# Patient Record
Sex: FEMALE | Race: WHITE | NOT HISPANIC OR LATINO | Employment: OTHER | URBAN - METROPOLITAN AREA
[De-identification: names, ages, dates, MRNs, and addresses within clinical notes are randomized per-mention and may not be internally consistent; named-entity substitution may affect disease eponyms.]

---

## 2017-05-24 ENCOUNTER — CONVERSION ENCOUNTER (OUTPATIENT)
Dept: OTHER | Facility: HOSPITAL | Age: 72
End: 2017-05-24

## 2017-11-01 ENCOUNTER — CONVERSION ENCOUNTER (OUTPATIENT)
Dept: OTHER | Facility: HOSPITAL | Age: 72
End: 2017-11-01

## 2017-11-08 ENCOUNTER — CONVERSION ENCOUNTER (OUTPATIENT)
Dept: OTHER | Facility: HOSPITAL | Age: 72
End: 2017-11-08

## 2017-12-13 ENCOUNTER — CONVERSION ENCOUNTER (OUTPATIENT)
Dept: OTHER | Facility: HOSPITAL | Age: 72
End: 2017-12-13

## 2017-12-28 ENCOUNTER — CONVERSION ENCOUNTER (OUTPATIENT)
Dept: OTHER | Facility: HOSPITAL | Age: 72
End: 2017-12-28

## 2018-05-09 ENCOUNTER — CONVERSION ENCOUNTER (OUTPATIENT)
Dept: OTHER | Facility: HOSPITAL | Age: 73
End: 2018-05-09

## 2018-07-11 ENCOUNTER — CONVERSION ENCOUNTER (OUTPATIENT)
Dept: OTHER | Facility: HOSPITAL | Age: 73
End: 2018-07-11

## 2018-10-24 ENCOUNTER — CONVERSION ENCOUNTER (OUTPATIENT)
Dept: OTHER | Facility: HOSPITAL | Age: 73
End: 2018-10-24

## 2019-01-09 ENCOUNTER — CONVERSION ENCOUNTER (OUTPATIENT)
Dept: OTHER | Facility: HOSPITAL | Age: 74
End: 2019-01-09

## 2019-06-04 VITALS
OXYGEN SATURATION: 94 % | WEIGHT: 186.6 LBS | HEART RATE: 60 BPM | HEIGHT: 65 IN | DIASTOLIC BLOOD PRESSURE: 93 MMHG | HEIGHT: 65 IN | HEART RATE: 61 BPM | DIASTOLIC BLOOD PRESSURE: 92 MMHG | WEIGHT: 187.6 LBS | DIASTOLIC BLOOD PRESSURE: 83 MMHG | SYSTOLIC BLOOD PRESSURE: 145 MMHG | OXYGEN SATURATION: 97 % | OXYGEN SATURATION: 96 % | HEIGHT: 65 IN | DIASTOLIC BLOOD PRESSURE: 84 MMHG | SYSTOLIC BLOOD PRESSURE: 162 MMHG | HEIGHT: 65 IN | DIASTOLIC BLOOD PRESSURE: 82 MMHG | WEIGHT: 190.2 LBS | HEART RATE: 61 BPM | OXYGEN SATURATION: 97 % | HEART RATE: 58 BPM | HEART RATE: 59 BPM | WEIGHT: 189.8 LBS | BODY MASS INDEX: 31.65 KG/M2 | SYSTOLIC BLOOD PRESSURE: 166 MMHG | HEIGHT: 65 IN | SYSTOLIC BLOOD PRESSURE: 180 MMHG | BODY MASS INDEX: 31.09 KG/M2 | BODY MASS INDEX: 30.52 KG/M2 | DIASTOLIC BLOOD PRESSURE: 72 MMHG | HEART RATE: 60 BPM | BODY MASS INDEX: 31.25 KG/M2 | OXYGEN SATURATION: 97 % | WEIGHT: 185 LBS | HEIGHT: 65 IN | HEART RATE: 58 BPM | DIASTOLIC BLOOD PRESSURE: 85 MMHG | HEART RATE: 61 BPM | DIASTOLIC BLOOD PRESSURE: 93 MMHG | RESPIRATION RATE: 18 BRPM | SYSTOLIC BLOOD PRESSURE: 148 MMHG | HEART RATE: 60 BPM | SYSTOLIC BLOOD PRESSURE: 171 MMHG | WEIGHT: 183 LBS | BODY MASS INDEX: 31.69 KG/M2 | WEIGHT: 188.6 LBS | SYSTOLIC BLOOD PRESSURE: 151 MMHG | HEIGHT: 65 IN | BODY MASS INDEX: 31.42 KG/M2 | BODY MASS INDEX: 30.82 KG/M2 | DIASTOLIC BLOOD PRESSURE: 80 MMHG | OXYGEN SATURATION: 96 % | WEIGHT: 190 LBS | BODY MASS INDEX: 30.49 KG/M2 | OXYGEN SATURATION: 95 % | WEIGHT: 183.2 LBS | BODY MASS INDEX: 31.62 KG/M2 | SYSTOLIC BLOOD PRESSURE: 180 MMHG | HEIGHT: 65 IN | HEIGHT: 65 IN | SYSTOLIC BLOOD PRESSURE: 164 MMHG

## 2019-07-31 ENCOUNTER — OFFICE VISIT (OUTPATIENT)
Dept: CARDIOLOGY | Facility: CLINIC | Age: 74
End: 2019-07-31

## 2019-07-31 VITALS
DIASTOLIC BLOOD PRESSURE: 88 MMHG | HEIGHT: 65 IN | HEART RATE: 137 BPM | SYSTOLIC BLOOD PRESSURE: 133 MMHG | OXYGEN SATURATION: 97 % | WEIGHT: 191 LBS | BODY MASS INDEX: 31.82 KG/M2

## 2019-07-31 DIAGNOSIS — I10 ESSENTIAL HYPERTENSION: ICD-10-CM

## 2019-07-31 DIAGNOSIS — I48.91 ATRIAL FIBRILLATION WITH RVR (HCC): Primary | ICD-10-CM

## 2019-07-31 PROCEDURE — 93000 ELECTROCARDIOGRAM COMPLETE: CPT | Performed by: NURSE PRACTITIONER

## 2019-07-31 PROCEDURE — 99213 OFFICE O/P EST LOW 20 MIN: CPT | Performed by: NURSE PRACTITIONER

## 2019-07-31 RX ORDER — LISINOPRIL 10 MG/1
10 TABLET ORAL DAILY
Refills: 10 | COMMUNITY
Start: 2019-05-14 | End: 2019-11-14 | Stop reason: SDUPTHER

## 2019-07-31 RX ORDER — METOPROLOL SUCCINATE 25 MG/1
25 TABLET, EXTENDED RELEASE ORAL DAILY
Qty: 30 TABLET | Refills: 11 | Status: SHIPPED | OUTPATIENT
Start: 2019-07-31 | End: 2019-08-19 | Stop reason: SINTOL

## 2019-07-31 RX ORDER — AMLODIPINE BESYLATE 10 MG/1
10 TABLET ORAL DAILY
Refills: 10 | COMMUNITY
Start: 2019-06-11 | End: 2019-07-31 | Stop reason: ALTCHOICE

## 2019-07-31 RX ORDER — ASPIRIN 81 MG/1
81 TABLET ORAL DAILY
COMMUNITY
Start: 2017-05-24 | End: 2019-08-19 | Stop reason: ALTCHOICE

## 2019-07-31 NOTE — PROGRESS NOTES
[facility] CARDIOLOGY      REASON FOR FOLLOW-UP:  Atrial fibrillation with rapid ventricular response  Hypertension  History of isolated episode of paroxysmal atrial fibrillation          Chief Complaint   Patient presents with   • Follow-up     6 month . no cardiac complaints offered to nursing    • Atrial Fibrillation   • Hypertension         Dear Dr. Esposito,        History of Present Illness   It was my pleasure to see Ms. Maguire in the office today.  She is a delightful 73-year-old female with no known history of ischemic heart disease.  She does have history of paroxysmal atrial fibrillation on 1 episode, and hypertension. She presents today in follow-up for these diagnoses.    Today, the patient reports that she feels well.  Specifically, she denies any chest pain, pressure, tightness or palpitations.  She denies any shortness of air at rest or dyspnea with exertion.  She denies any lower extremity edema, dizziness or lightheadedness.  EKG today shows atrial fibrillation with rapid ventricular response, rate 137 bpm.  The patient is completely asymptomatic.  Had lengthy discussion with patient today regarding guideline directed treatment for A. fib including anticoagulation-risks and benefits of warfarin versus novel OAC's reviewed.  Recommend EP evaluation for further treatment options.  My concern is that patient has been having silent A. fib for quite some time.  Patient does report having a very stressful event recently with a family member's wedding.  Otherwise, she has had no significant issues.    2D echocardiogram 6/2018 showed normal LV function, EF 55-60% with evidence of diastolic dysfunction.  No valvular disease reported.     Assessment:  Atrial fibrillation with rapid ventricular response  Hypertension  Isolated episode of atrial fibrillation        Plan:  We will start Eliquis 5 mg 1 tab p.o. twice daily.  Samples given  Discontinue amlodipine  Start metoprolol succinate 25 mg 1 p.o.  daily  Referral to EP  Call for any problems        The following portions of the patient's history were reviewed and updated as appropriate: allergies, current medications, past family history, past medical history, past social history, past surgical history and problem list.    REVIEW OF SYSTEMS:    Review of Systems    Vitals:    07/31/19 1027   BP: 133/88   Pulse: (!) 137   SpO2: 97%         PHYSICAL EXAM:    General: Alert, cooperative, no distress, appears stated age  Head:  Normocephalic, atraumatic, mucous membranes moist  Eyes:  Conjunctiva/corneas clear, EOM's intact     Neck:  Supple,  no adenopathy;      Lungs: Clear to auscultation bilaterally, no wheezes rhonchi rales are noted  Chest wall: No tenderness  Heart::  Irregularly irregular rate and rhythm, S1 and S2 normal, no murmur, rub or gallop  Abdomen: Soft, non-tender, nondistended bowel sounds active  Extremities: No cyanosis, clubbing, or edema groin soft no hematoma.  Pulses: 2+ and symmetric all extremities  Skin:  No rashes or lesions  Neuro/psych: A&O x3. CN II through XII are grossly intact with appropriate affect        Past Medical History:   Diagnosis Date   • Acute hypokalemia    • Arthritis    • Atrial fibrillation (CMS/HCC)    • Fibromyalgia    • Hypertension    • Palpitations        Past Surgical History:   Procedure Laterality Date   • TUBAL ABDOMINAL LIGATION         No current outpatient medications on file.    Allergies   Allergen Reactions   • Amlodipine Other (See Comments)     Constant urination    • Cyclobenzaprine Palpitations   • Doxazosin Mesylate Other (See Comments)     itching   • Lisinopril Cough   • Tetracycline Nausea Only   • Valsartan Itching     Itching , constant urination , nausea    • Amiodarone Other (See Comments)       Family History   Problem Relation Age of Onset   • Thyroid disease Sister    • Cancer Brother        Social History     Tobacco Use   • Smoking status: Never Smoker   Substance Use Topics   •  Alcohol use: Not on file       Physical Exam    Current Electrocardiogram:    ECG 12 Lead  Date/Time: 7/31/2019 11:26 AM  Performed by: Jennifer Gabriel APRN  Authorized by: Jennifer Gabriel APRN   Comparison: compared with previous ECG from 1/9/2019  Rhythm: atrial fibrillation  Ectopy: unifocal PVCs  BPM: 137                SYBIL Mac  07/31/19  10:48 AM

## 2019-08-06 ENCOUNTER — TELEPHONE (OUTPATIENT)
Dept: CARDIOLOGY | Facility: CLINIC | Age: 74
End: 2019-08-06

## 2019-08-06 NOTE — TELEPHONE ENCOUNTER
Patient calls today with c/o frequent urination with very little flow.  Patient questioning if it could be the metoprolol succinate which was begun on 7/31 along with Eliquis.  Symptoms began 8/4 & 8/5.  Spoke with Yoly who recommended she contact her PCP (Dr. Esposito) regarding these symptoms.  Yoly does not believe it is a side effect of either medicine she began.  Returned call to patient-no answer-left message with Yoly's recommendation.

## 2019-08-19 ENCOUNTER — CONSULT (OUTPATIENT)
Dept: CARDIOLOGY | Facility: CLINIC | Age: 74
End: 2019-08-19

## 2019-08-19 VITALS
SYSTOLIC BLOOD PRESSURE: 118 MMHG | OXYGEN SATURATION: 98 % | RESPIRATION RATE: 18 BRPM | HEART RATE: 100 BPM | DIASTOLIC BLOOD PRESSURE: 87 MMHG

## 2019-08-19 DIAGNOSIS — I48.19 PERSISTENT ATRIAL FIBRILLATION (HCC): Primary | ICD-10-CM

## 2019-08-19 DIAGNOSIS — I48.91 ATRIAL FIBRILLATION WITH RVR (HCC): ICD-10-CM

## 2019-08-19 DIAGNOSIS — I10 ESSENTIAL HYPERTENSION: ICD-10-CM

## 2019-08-19 DIAGNOSIS — K21.9 GASTROESOPHAGEAL REFLUX DISEASE WITHOUT ESOPHAGITIS: ICD-10-CM

## 2019-08-19 PROCEDURE — 99204 OFFICE O/P NEW MOD 45 MIN: CPT | Performed by: INTERNAL MEDICINE

## 2019-08-19 RX ORDER — AMLODIPINE BESYLATE 10 MG/1
10 TABLET ORAL DAILY
COMMUNITY
End: 2019-08-19 | Stop reason: ALTCHOICE

## 2019-08-19 RX ORDER — DILTIAZEM HYDROCHLORIDE 180 MG/1
180 CAPSULE, COATED, EXTENDED RELEASE ORAL DAILY
Qty: 30 CAPSULE | Refills: 3 | Status: SHIPPED | OUTPATIENT
Start: 2019-08-19 | End: 2019-09-16

## 2019-08-19 NOTE — PROGRESS NOTES
History of Present Illness   It was my pleasure to see Ms. Maguire in the office today.  She is a delightful 74-year-old female with no known history of ischemic heart disease.  She does have history of atrial fibrillation of unknown duration and was placed on Eliquis and metoprolol and she could not tolerate either of the medications and comes in for evaluation.  She has clinical history of hypertension with a chads Vasc score of 3 .  She is very active and denies any symptoms of chest pain, dizziness, lightheadedness or any symptoms suggestive of congestive heart failure and denies any bleeding diathesis or TIA or stroke  2D echocardiogram 6/2018 showed normal LV function, EF 55-60% with evidence of diastolic dysfunction.  No valvular disease reported.        Past Medical History:   Diagnosis Date   • Acute hypokalemia    • Arthritis    • Atrial fibrillation (CMS/HCC)    • Fibromyalgia    • Hypertension    • Palpitations      Past Surgical History:   Procedure Laterality Date   • TUBAL ABDOMINAL LIGATION       Family History   Problem Relation Age of Onset   • Thyroid disease Sister    • Cancer Brother      Social History     Tobacco Use   • Smoking status: Never Smoker   • Smokeless tobacco: Never Used   Substance Use Topics   • Alcohol use: No     Frequency: Never   • Drug use: No       (Not in a hospital admission)  Allergies:  Cyclobenzaprine; Doxazosin mesylate; Lisinopril; Tetracycline; Valsartan; Amiodarone; and Hydrochlorothiazide    Review of Systems   General: Negative  for fatigue and tiredness  Eyes: No redness  Cardiovascular: No chest pain, no palpitations  Respiratory:   Negative shortness of breath  Gastrointestinal: No nausea or vomiting, bleeding  Genitourinary: no hematuria or dysuria  Musculoskeletal: No arthralgia or myalgia  Skin: No rash  Neurologic: No numbness, tingling, syncope  Hematologic/Lymphatic: No abnormal bleeding      Family history noncontributory for arrhythmias or  stroke      Physical Exam  VITALS REVIEWED--heart rate is 100 bpm irregularly irregular in atrial fibrillation with a blood pressure 118/87 , respiratory rate of 12 and patient is afebrile    General:      well developed, well nourished, in no acute distress.    Head:      normocephalic and atraumatic.    Eyes:      PERRL/EOM intact, conjunctiva and sclera clear with out nystagmus.    Neck:      no masses, thyromegaly,  trachea central with normal respiratory effort and PMI nondisplaced Lungs:      clear bilaterally to auscultation.    Heart:       underlying rapid atrial fibrillation with irregularly irregular rhythm and rapid ventricular rate without any murmurs gallops or rubs  Msk:      no deformity or scoliosis noted of thoracic or lumbar spine.    Pulses:      pulses normal in all 4 extremities.    Extremities:       no cyanosis or clubbing--trace left pedal edema and trace right pedal edema.    Neurologic:      no focal deficits.   alert oriented x3  Skin:      intact without lesions or rashes.    Psych:      alert and cooperative; normal mood and affect; normal attention span and concentration.        Assessment plan    Rapid atrial fibrillation and duration is unknown likely persistent atrial fibrillation since patient is asymptomatic  Clinical hypertension  Patient educated on importance of anticoagulation and initiated on Xarelto  Stop amlodipine and start Cardizem for better rate control to prevent atrial arrhythmia induced cardiomyopathy in future  Follow-up in 4 weeks  Medications reviewed

## 2019-09-16 ENCOUNTER — OFFICE VISIT (OUTPATIENT)
Dept: CARDIOLOGY | Facility: CLINIC | Age: 74
End: 2019-09-16

## 2019-09-16 VITALS
WEIGHT: 190.2 LBS | DIASTOLIC BLOOD PRESSURE: 75 MMHG | SYSTOLIC BLOOD PRESSURE: 151 MMHG | BODY MASS INDEX: 31.65 KG/M2 | OXYGEN SATURATION: 97 % | HEART RATE: 75 BPM | RESPIRATION RATE: 18 BRPM

## 2019-09-16 DIAGNOSIS — I10 ESSENTIAL HYPERTENSION: ICD-10-CM

## 2019-09-16 DIAGNOSIS — R00.2 PALPITATIONS: ICD-10-CM

## 2019-09-16 DIAGNOSIS — I48.19 PERSISTENT ATRIAL FIBRILLATION (HCC): Primary | ICD-10-CM

## 2019-09-16 PROCEDURE — 93000 ELECTROCARDIOGRAM COMPLETE: CPT | Performed by: INTERNAL MEDICINE

## 2019-09-16 PROCEDURE — 99214 OFFICE O/P EST MOD 30 MIN: CPT | Performed by: INTERNAL MEDICINE

## 2019-09-16 RX ORDER — METOPROLOL SUCCINATE 50 MG/1
50 TABLET, EXTENDED RELEASE ORAL DAILY
Qty: 30 TABLET | Refills: 1 | Status: SHIPPED | OUTPATIENT
Start: 2019-09-16 | End: 2019-09-25 | Stop reason: SDUPTHER

## 2019-09-16 RX ORDER — AMLODIPINE BESYLATE 10 MG/1
10 TABLET ORAL DAILY
COMMUNITY
End: 2019-09-16 | Stop reason: ALTCHOICE

## 2019-09-16 RX ORDER — DABIGATRAN ETEXILATE 150 MG/1
150 CAPSULE ORAL 2 TIMES DAILY
Qty: 60 CAPSULE | Refills: 1 | Status: SHIPPED | OUTPATIENT
Start: 2019-09-16 | End: 2019-10-01 | Stop reason: SDUPTHER

## 2019-09-16 NOTE — PROGRESS NOTES
History of Present Illness   It was my pleasure to see Ms. Maguire in the office today.  She is a delightful 74-year-old female with no known history of ischemic heart disease.  She does have history of atrial fibrillation of unknown duration and was placed on Eliquis  and she could not tolerate either of the medications and comes in for evaluation.  She has clinical history of hypertension with a chads Vasc score of 3 .  She is very active and denies any symptoms of chest pain, dizziness, lightheadedness or any symptoms suggestive of congestive heart failure and denies any bleeding diathesis or TIA or stroke  2D echocardiogram 6/2018 showed normal LV function, EF 55-60% with evidence of diastolic dysfunction.  No valvular disease reported.  Patient was given a prescription for Cardizem and Xarelto and patient could not tolerate either of the medications  He denies any new cardiovascular symptoms since last 1 month        Past Medical History:   Diagnosis Date   • Acute hypokalemia    • Arthritis    • Atrial fibrillation (CMS/HCC)    • Fibromyalgia    • Hypertension    • Palpitations      Past Surgical History:   Procedure Laterality Date   • TUBAL ABDOMINAL LIGATION       Family History   Problem Relation Age of Onset   • Thyroid disease Sister    • Cancer Brother      Social History     Tobacco Use   • Smoking status: Never Smoker   • Smokeless tobacco: Never Used   Substance Use Topics   • Alcohol use: No     Frequency: Never   • Drug use: No       (Not in a hospital admission)  Allergies:  Cyclobenzaprine; Doxazosin mesylate; Lisinopril; Tetracycline; Valsartan; Amiodarone; and Hydrochlorothiazide    Review of Systems   General: Negative  for fatigue and tiredness  Eyes: No redness  Cardiovascular: No chest pain, no palpitations  Respiratory:   Negative shortness of breath  Gastrointestinal: No nausea or vomiting, bleeding  Genitourinary: no hematuria or dysuria  Musculoskeletal: No arthralgia or  myalgia  Skin: No rash  Neurologic: No numbness, tingling, syncope  Hematologic/Lymphatic: No abnormal bleeding      Family history noncontributory for arrhythmias or stroke      Physical Exam  VITALS REVIEWED--heart rate is 86 bpm irregularly irregular in atrial fibrillation with a blood pressure 151/75 , respiratory rate of 12 and patient is afebrile    General:      well developed, well nourished, in no acute distress.    Head:      normocephalic and atraumatic.    Eyes:      PERRL/EOM intact, conjunctiva and sclera clear with out nystagmus.    Neck:      no masses, thyromegaly,  trachea central with normal respiratory effort and PMI nondisplaced Lungs:      clear bilaterally to auscultation.    Heart:       underlying rapid atrial fibrillation with irregularly irregular rhythm and rapid ventricular rate without any murmurs gallops or rubs  Msk:      no deformity or scoliosis noted of thoracic or lumbar spine.    Pulses:      pulses normal in all 4 extremities.    Extremities:       no cyanosis or clubbing--trace left pedal edema and trace right pedal edema.    Neurologic:      no focal deficits.   alert oriented x3  Skin:      intact without lesions or rashes.    Psych:      alert and cooperative; normal mood and affect; normal attention span and concentration.        Assessment plan    Rapid atrial fibrillation and duration is unknown likely persistent atrial fibrillation since patient is asymptomatic  Clinical hypertension  Patient educated on importance of anticoagulation and options given --intolerant to Eliquis and  Xarelto--recommended warfarin versus Pradaxa and patient chose to Pradaxa  Low-dose of beta-blockers to be tried in the form of long-acting metoprolol  Other options if totally intolerant to anticoagulations like watchman educated  Stop amlodipine   Follow-up in 4 weeks  Medications reviewed    EKG shows atrial fibrillation--heart rate of 124 low voltage complexes in the precordial leads and  diffuse nonspecific ST-T wave changes QTc interval of 390 ms and no significant changes compared to prior ECG and EKG indication includes palpitations and atrial fibrillation

## 2019-09-25 RX ORDER — METOPROLOL SUCCINATE 50 MG/1
50 TABLET, EXTENDED RELEASE ORAL DAILY
Qty: 30 TABLET | Refills: 1 | Status: SHIPPED | OUTPATIENT
Start: 2019-09-25 | End: 2019-10-21

## 2019-10-01 RX ORDER — DABIGATRAN ETEXILATE 150 MG/1
150 CAPSULE ORAL 2 TIMES DAILY
Qty: 60 CAPSULE | Refills: 1 | Status: SHIPPED | OUTPATIENT
Start: 2019-10-01 | End: 2019-10-21

## 2019-10-01 RX ORDER — DABIGATRAN ETEXILATE 150 MG/1
150 CAPSULE ORAL 2 TIMES DAILY
Qty: 60 CAPSULE | Refills: 1 | Status: SHIPPED | OUTPATIENT
Start: 2019-10-01 | End: 2019-10-02 | Stop reason: SDUPTHER

## 2019-10-02 RX ORDER — DABIGATRAN ETEXILATE 150 MG/1
150 CAPSULE ORAL 2 TIMES DAILY
Qty: 60 CAPSULE | Refills: 1 | Status: SHIPPED | OUTPATIENT
Start: 2019-10-02 | End: 2019-10-21

## 2019-10-04 ENCOUNTER — TELEPHONE (OUTPATIENT)
Dept: CARDIOLOGY | Facility: CLINIC | Age: 74
End: 2019-10-04

## 2019-10-04 RX ORDER — WARFARIN SODIUM 5 MG/1
5 TABLET ORAL
Qty: 30 TABLET | Refills: 2 | Status: SHIPPED | OUTPATIENT
Start: 2019-10-04 | End: 2019-11-01 | Stop reason: SDUPTHER

## 2019-10-04 NOTE — TELEPHONE ENCOUNTER
Pt called jorge to let us know she could not afford the medication prescribed to her.  The Pradaxa, and the metoprolol.  I spoke with Dr Kwon, and he changed the pradaxa to Coumadin 5mg, and wants to see her in office before changing the metoprolol. I also let her know with coumadin she needs to have follow up with M[facility] to have her INR checked.  She made appointment to see her wednesday in [place].  She states she understands. Rx was sent to the pharmacy for coumadin.

## 2019-10-16 ENCOUNTER — ANTICOAGULATION VISIT (OUTPATIENT)
Dept: CARDIOLOGY | Facility: CLINIC | Age: 74
End: 2019-10-16

## 2019-10-16 DIAGNOSIS — I48.19 PERSISTENT ATRIAL FIBRILLATION (HCC): Primary | ICD-10-CM

## 2019-10-16 LAB — INR PPP: 1 (ref 0.9–1.1)

## 2019-10-16 PROCEDURE — 85610 PROTHROMBIN TIME: CPT | Performed by: INTERNAL MEDICINE

## 2019-10-16 PROCEDURE — 36416 COLLJ CAPILLARY BLOOD SPEC: CPT | Performed by: INTERNAL MEDICINE

## 2019-10-21 ENCOUNTER — OFFICE VISIT (OUTPATIENT)
Dept: CARDIOLOGY | Facility: CLINIC | Age: 74
End: 2019-10-21

## 2019-10-21 VITALS
DIASTOLIC BLOOD PRESSURE: 86 MMHG | OXYGEN SATURATION: 98 % | HEART RATE: 118 BPM | SYSTOLIC BLOOD PRESSURE: 120 MMHG | BODY MASS INDEX: 31.62 KG/M2 | WEIGHT: 190 LBS

## 2019-10-21 DIAGNOSIS — R00.2 PALPITATIONS: ICD-10-CM

## 2019-10-21 DIAGNOSIS — I10 ESSENTIAL HYPERTENSION: ICD-10-CM

## 2019-10-21 DIAGNOSIS — K21.9 GASTROESOPHAGEAL REFLUX DISEASE WITHOUT ESOPHAGITIS: ICD-10-CM

## 2019-10-21 DIAGNOSIS — I48.19 PERSISTENT ATRIAL FIBRILLATION (HCC): Primary | ICD-10-CM

## 2019-10-21 PROCEDURE — 99214 OFFICE O/P EST MOD 30 MIN: CPT | Performed by: INTERNAL MEDICINE

## 2019-10-21 RX ORDER — DIGOXIN 125 MCG
125 TABLET ORAL DAILY
Qty: 30 TABLET | Refills: 2 | Status: SHIPPED | OUTPATIENT
Start: 2019-10-21 | End: 2019-11-18 | Stop reason: SINTOL

## 2019-10-21 NOTE — PROGRESS NOTES
History of Present Illness   It was my pleasure to see Ms. Maguire in the office today.  She is a delightful 74-year-old female with no known history of ischemic heart disease.  She does have history of atrial fibrillation of unknown duration and was intolerant to Eliquis, Pradaxa and Xarelto and is able to tolerate warfarin currently and she could not tolerate beta-blockers and Cardizem in the past and continues to have uncontrollable atrial fibrillation and comes in back for evaluation--  She has clinical history of hypertension with a chads Vasc score of 3 .  She is very active and denies any symptoms of chest pain, dizziness, lightheadedness or any symptoms suggestive of congestive heart failure and denies any bleeding diathesis or TIA or stroke  2D echocardiogram 6/2018 showed normal LV function, EF 55-60% with evidence of diastolic dysfunction.  No valvular disease reported.  Currently complains only of mild fatigue      Past Medical History:   Diagnosis Date   • Acute hypokalemia    • Arthritis    • Atrial fibrillation (CMS/HCC)    • Fibromyalgia    • Hypertension    • Palpitations      Past Surgical History:   Procedure Laterality Date   • TUBAL ABDOMINAL LIGATION       Family History   Problem Relation Age of Onset   • Thyroid disease Sister    • Cancer Brother      Social History     Tobacco Use   • Smoking status: Never Smoker   • Smokeless tobacco: Never Used   Substance Use Topics   • Alcohol use: No     Frequency: Never   • Drug use: No       (Not in a hospital admission)  Allergies:  Cyclobenzaprine; Doxazosin mesylate; Lisinopril; Tetracycline; Valsartan; Amiodarone; and Hydrochlorothiazide    Review of Systems   General: Negative  for fatigue and tiredness  Eyes: No redness  Cardiovascular: No chest pain, no palpitations  Respiratory:   Negative shortness of breath  Gastrointestinal: No nausea or vomiting, bleeding  Genitourinary: no hematuria or dysuria  Musculoskeletal: No arthralgia or  myalgia  Skin: No rash  Neurologic: No numbness, tingling, syncope  Hematologic/Lymphatic: No abnormal bleeding      Family history noncontributory for arrhythmias or stroke      Physical Exam  VITALS REVIEWED--heart rate is 118 bpm irregularly irregular in atrial fibrillation with a blood pressure 151/75 , respiratory rate of 12 and patient is afebrile    General:      well developed, well nourished, in no acute distress.    Head:      normocephalic and atraumatic.    Eyes:      PERRL/EOM intact, conjunctiva and sclera clear with out nystagmus.    Neck:      no masses, thyromegaly,  trachea central with normal respiratory effort and PMI nondisplaced Lungs:      clear bilaterally to auscultation.    Heart:       underlying rapid atrial fibrillation with irregularly irregular rhythm and rapid ventricular rate without any murmurs gallops or rubs  Msk:      no deformity or scoliosis noted of thoracic or lumbar spine.    Pulses:      pulses normal in all 4 extremities.    Extremities:       no cyanosis or clubbing--trace left pedal edema and trace right pedal edema.    Neurologic:      no focal deficits.   alert oriented x3  Skin:      intact without lesions or rashes.    Psych:      alert and cooperative; normal mood and affect; normal attention span and concentration.        Assessment plan    Rapid atrial fibrillation and duration is unknown likely persistent atrial fibrillation since patient is asymptomatic  Clinical hypertension  Patient educated on importance of anticoagulation and options given --intolerant to Eliquis , Pradaxa and  Xarelto--currently taking warfarin   Intolerant of beta-blockers and Cardizem --check TSH and CMP and try low-dose digoxin and follow-up in 1 month  Other options if totally intolerant to anticoagulations like watchman educated  Medications reviewed

## 2019-10-23 ENCOUNTER — ANTICOAGULATION VISIT (OUTPATIENT)
Dept: CARDIOLOGY | Facility: CLINIC | Age: 74
End: 2019-10-23

## 2019-10-23 DIAGNOSIS — I48.91 ATRIAL FIBRILLATION, UNSPECIFIED TYPE (HCC): Primary | ICD-10-CM

## 2019-10-23 LAB — INR PPP: 2.4 (ref 0.9–1.1)

## 2019-10-23 PROCEDURE — 36416 COLLJ CAPILLARY BLOOD SPEC: CPT | Performed by: INTERNAL MEDICINE

## 2019-10-23 PROCEDURE — 85610 PROTHROMBIN TIME: CPT | Performed by: INTERNAL MEDICINE

## 2019-11-04 RX ORDER — WARFARIN SODIUM 5 MG/1
TABLET ORAL
Qty: 30 TABLET | Refills: 2 | Status: SHIPPED | OUTPATIENT
Start: 2019-11-04 | End: 2019-12-23

## 2019-11-06 ENCOUNTER — ANTICOAGULATION VISIT (OUTPATIENT)
Dept: CARDIOLOGY | Facility: CLINIC | Age: 74
End: 2019-11-06

## 2019-11-06 DIAGNOSIS — I48.91 ATRIAL FIBRILLATION, UNSPECIFIED TYPE (HCC): Primary | ICD-10-CM

## 2019-11-06 LAB — INR PPP: 1.5 (ref 0.9–1.1)

## 2019-11-06 PROCEDURE — 36416 COLLJ CAPILLARY BLOOD SPEC: CPT | Performed by: INTERNAL MEDICINE

## 2019-11-06 PROCEDURE — 85610 PROTHROMBIN TIME: CPT | Performed by: INTERNAL MEDICINE

## 2019-11-14 RX ORDER — LISINOPRIL 10 MG/1
10 TABLET ORAL DAILY
Qty: 30 TABLET | Refills: 11 | Status: SHIPPED | OUTPATIENT
Start: 2019-11-14 | End: 2019-11-18 | Stop reason: ALTCHOICE

## 2019-11-14 RX ORDER — LISINOPRIL 10 MG/1
TABLET ORAL
Qty: 90 TABLET | Refills: 3 | Status: SHIPPED | OUTPATIENT
Start: 2019-11-14 | End: 2019-11-18 | Stop reason: ALTCHOICE

## 2019-11-18 ENCOUNTER — OFFICE VISIT (OUTPATIENT)
Dept: CARDIOLOGY | Facility: CLINIC | Age: 74
End: 2019-11-18

## 2019-11-18 VITALS
HEART RATE: 92 BPM | WEIGHT: 185 LBS | SYSTOLIC BLOOD PRESSURE: 121 MMHG | OXYGEN SATURATION: 98 % | DIASTOLIC BLOOD PRESSURE: 74 MMHG | BODY MASS INDEX: 30.79 KG/M2

## 2019-11-18 DIAGNOSIS — I10 ESSENTIAL HYPERTENSION: ICD-10-CM

## 2019-11-18 DIAGNOSIS — I48.91 ATRIAL FIBRILLATION WITH RVR (HCC): Primary | ICD-10-CM

## 2019-11-18 PROCEDURE — 99214 OFFICE O/P EST MOD 30 MIN: CPT | Performed by: NURSE PRACTITIONER

## 2019-11-18 RX ORDER — SUCRALFATE ORAL 1 G/10ML
1 SUSPENSION ORAL 4 TIMES DAILY
Qty: 120 ML | Refills: 0 | Status: SHIPPED | OUTPATIENT
Start: 2019-11-18 | End: 2019-12-23

## 2019-11-18 RX ORDER — LISINOPRIL AND HYDROCHLOROTHIAZIDE 12.5; 1 MG/1; MG/1
1 TABLET ORAL DAILY
COMMUNITY
End: 2019-11-19 | Stop reason: SDUPTHER

## 2019-11-18 NOTE — PROGRESS NOTES
"Cardiology Office Follow Up Visit      Primary Care Provider:  Coco Johnston, APRN    Reason for f/u: Atrial fibrillation       Coco Johnston, APRN    History of Present Illness     It was nice to see Facundo Maguire in follow-up for atrial fibrillation of unknown duration. She is a 74 y.o. female with a history of atrial fibrillation intolerant to Eliquis, Pradaxa, and Xarelto, and could not tolerate previous beta-blockers and Cardizem in the past.  Other chronic medical conditions include hypertension, fibromyalgia, psoriasis, IBS.  She denies history of CVA/TIA or diabetes.  She continues to have uncontrollable atrial fibrillation, Dr. Kwon placed her on digoxin and warfarin in October, she states that she began having nausea and diarrhea and was uncertain which medication caused it and she has since discontinued taking both.  She comes in today for routine follow-up for atrial fibrillation evaluation.  She states that she began having a cough and shortness of breath a few weeks ago, she resumed taking Prinivil with HCTZ with resolution of her symptomology.  Of note, both of these medications are listed on her allergies, the patient states that she is tolerating them.  She denies chest pain, dizziness, edema, syncope or near syncopal events.  She states that her diarrhea has significantly reduced, but she still has abdominal discomfort that she describes as a \"burning\" sensation is reluctant to try any further medications until this is resolved.    6/2018 2D echo: Normal LV function with EF 55 to 60%, evidence of diastolic dysfunction, no valvular disease    Social history: Denies smoking, denies EtOH, denies illicit use, denies herbal use.  Lives alone.    Past Medical History:   Diagnosis Date   • Acute hypokalemia    • Arthritis    • Atrial fibrillation (CMS/HCC)    • Fibromyalgia    • Hypertension    • Palpitations        Past Surgical History:   Procedure Laterality Date   • TUBAL ABDOMINAL " LIGATION           Current Outpatient Medications:   •  lansoprazole (PREVACID SOLUTAB) 30 MG disintegrating tablet, Take 30 mg by mouth Daily., Disp: , Rfl:   •  lisinopril-hydrochlorothiazide (PRINZIDE,ZESTORETIC) 10-12.5 MG per tablet, Take 1 tablet by mouth Daily., Disp: 90 tablet, Rfl: 1  •  sucralfate (CARAFATE) 1 GM/10ML suspension, Take 10 mL by mouth 4 (Four) Times a Day., Disp: 120 mL, Rfl: 0  •  warfarin (COUMADIN) 5 MG tablet, TAKE 1 TABLET BY MOUTH EVERY DAY, Disp: 30 tablet, Rfl: 2    Social History     Socioeconomic History   • Marital status:      Spouse name: Not on file   • Number of children: Not on file   • Years of education: Not on file   • Highest education level: Not on file   Tobacco Use   • Smoking status: Never Smoker   • Smokeless tobacco: Never Used   Substance and Sexual Activity   • Alcohol use: No     Frequency: Never   • Drug use: No       Family History   Problem Relation Age of Onset   • Thyroid disease Sister    • Cancer Brother        The following portions of the patient's history were reviewed and updated as appropriate: allergies, current medications, past family history, past medical history, past social history, past surgical history and problem list.        Review of Systems   Constitution: Negative for diaphoresis, malaise/fatigue, weight gain and weight loss.   Cardiovascular: Negative for chest pain, dyspnea on exertion, leg swelling, near-syncope, orthopnea, palpitations and syncope.   Respiratory: Positive for cough (resolving) and shortness of breath (resolved). Negative for hemoptysis, sputum production and wheezing.    Skin: Negative for rash.   Musculoskeletal: Positive for arthritis and myalgias.   Gastrointestinal: Positive for abdominal pain, diarrhea (resolving) and nausea (resolved). Negative for hematemesis, hematochezia and vomiting.   Neurological: Negative for dizziness, light-headedness, numbness and seizures.   Psychiatric/Behavioral: Negative.     All other systems reviewed and are negative.    /74   Pulse 92   Wt 83.9 kg (185 lb)   SpO2 98%   BMI 30.79 kg/m² .  Objective     Physical Exam   Constitutional: She is oriented to person, place, and time. She appears well-developed and well-nourished. She is cooperative.   Neck: Normal carotid pulses and no JVD present. Carotid bruit is not present.   Cardiovascular: Normal heart sounds and intact distal pulses. An irregular rhythm present. Tachycardia present. Exam reveals no gallop and no friction rub.   No murmur heard.  Pulses:       Carotid pulses are 2+ on the right side, and 2+ on the left side.       Radial pulses are 2+ on the right side, and 2+ on the left side.        Posterior tibial pulses are 2+ on the right side, and 2+ on the left side.   Pulmonary/Chest: Effort normal and breath sounds normal. She has no decreased breath sounds. She has no wheezes. She has no rhonchi. She has no rales.   Abdominal: Soft. Bowel sounds are normal. She exhibits no distension and no mass. There is no hepatosplenomegaly. There is no tenderness. There is no guarding.   Musculoskeletal: She exhibits no edema.   Neurological: She is alert and oriented to person, place, and time.   Skin: Skin is warm and dry. No rash noted. No erythema. No pallor.   Psychiatric: She has a normal mood and affect. Her speech is normal and behavior is normal. Judgment and thought content normal.         Procedures    EDD-VASC score: 4   HAS-BLED score: 2    Assessment/Plan:    1.  Atrial fibrillation of unknown duration------ intolerant of rate control medications, asymptomatic  2.  Abdominal discomfort, history IBS  3.  Hypertension----well-controlled on Prinivil and HCTZ  4.  Diastolic dysfunction----stable  5.  Fibromyalgia, chronic fatigue    Initiate Carafate 4 times daily, OTC antidiarrheals as needed, need to return to GI physician if symptomatology continues.  Reiterated the importance of anticoagulation for stroke  prevention with ongoing atrial fibrillation, the patient states that she will resume Coumadin once her abdominal issue is resolved, she wishes not to pursue watchman discussion at this time.  She is to follow-up in 1 month for reevaluation at which time we discussed initiating Toprol, patient is agreeable.    SYBIL Gonzalez  EP cardiology  **All problems new to this practitioner  **>25 minutes in face to face conversation regarding treatment plan, education, and answering any questions the patient may have had

## 2019-11-19 RX ORDER — LISINOPRIL AND HYDROCHLOROTHIAZIDE 12.5; 1 MG/1; MG/1
1 TABLET ORAL DAILY
Qty: 90 TABLET | Refills: 1 | Status: SHIPPED | OUTPATIENT
Start: 2019-11-19 | End: 2019-12-23

## 2019-12-17 ENCOUNTER — OUTSIDE FACILITY SERVICE (OUTPATIENT)
Dept: CARDIOLOGY | Facility: CLINIC | Age: 74
End: 2019-12-17

## 2019-12-17 PROCEDURE — 99222 1ST HOSP IP/OBS MODERATE 55: CPT | Performed by: INTERNAL MEDICINE

## 2019-12-17 PROCEDURE — 93458 L HRT ARTERY/VENTRICLE ANGIO: CPT | Performed by: INTERNAL MEDICINE

## 2019-12-18 ENCOUNTER — OUTSIDE FACILITY SERVICE (OUTPATIENT)
Dept: CARDIOLOGY | Facility: CLINIC | Age: 74
End: 2019-12-18

## 2019-12-18 PROCEDURE — 99232 SBSQ HOSP IP/OBS MODERATE 35: CPT | Performed by: INTERNAL MEDICINE

## 2019-12-19 ENCOUNTER — OUTSIDE FACILITY SERVICE (OUTPATIENT)
Dept: CARDIOLOGY | Facility: CLINIC | Age: 74
End: 2019-12-19

## 2019-12-19 PROCEDURE — 93010 ELECTROCARDIOGRAM REPORT: CPT | Performed by: INTERNAL MEDICINE

## 2019-12-19 PROCEDURE — 99232 SBSQ HOSP IP/OBS MODERATE 35: CPT | Performed by: INTERNAL MEDICINE

## 2019-12-19 PROCEDURE — 92960 CARDIOVERSION ELECTRIC EXT: CPT | Performed by: INTERNAL MEDICINE

## 2019-12-23 ENCOUNTER — OFFICE VISIT (OUTPATIENT)
Dept: CARDIOLOGY | Facility: CLINIC | Age: 74
End: 2019-12-23

## 2019-12-23 VITALS
BODY MASS INDEX: 31.12 KG/M2 | SYSTOLIC BLOOD PRESSURE: 144 MMHG | DIASTOLIC BLOOD PRESSURE: 90 MMHG | HEART RATE: 118 BPM | WEIGHT: 187 LBS

## 2019-12-23 DIAGNOSIS — I10 ESSENTIAL HYPERTENSION: ICD-10-CM

## 2019-12-23 DIAGNOSIS — I48.0 PAF (PAROXYSMAL ATRIAL FIBRILLATION) (HCC): Primary | ICD-10-CM

## 2019-12-23 PROCEDURE — 99214 OFFICE O/P EST MOD 30 MIN: CPT | Performed by: NURSE PRACTITIONER

## 2019-12-23 PROCEDURE — 93000 ELECTROCARDIOGRAM COMPLETE: CPT | Performed by: NURSE PRACTITIONER

## 2019-12-23 RX ORDER — APIXABAN 5 MG/1
1 TABLET, FILM COATED ORAL 2 TIMES DAILY
COMMUNITY
Start: 2019-12-20 | End: 2020-05-05 | Stop reason: SDUPTHER

## 2019-12-23 RX ORDER — FUROSEMIDE 20 MG/1
20 TABLET ORAL DAILY
Qty: 30 TABLET | Refills: 6 | Status: SHIPPED | OUTPATIENT
Start: 2019-12-23 | End: 2020-01-13

## 2019-12-23 RX ORDER — AMLODIPINE BESYLATE 10 MG/1
10 TABLET ORAL DAILY
Refills: 10 | COMMUNITY
Start: 2019-10-11 | End: 2020-01-13

## 2019-12-23 RX ORDER — POTASSIUM CHLORIDE 750 MG/1
10 TABLET, FILM COATED, EXTENDED RELEASE ORAL DAILY
Qty: 30 TABLET | Refills: 6 | Status: SHIPPED | OUTPATIENT
Start: 2019-12-23 | End: 2020-01-16 | Stop reason: SDUPTHER

## 2019-12-23 RX ORDER — SUCRALFATE ORAL 1 G/10ML
1 SUSPENSION ORAL 4 TIMES DAILY
Qty: 120 ML | Refills: 1 | Status: SHIPPED | OUTPATIENT
Start: 2019-12-23 | End: 2020-01-13

## 2019-12-23 RX ORDER — ATORVASTATIN CALCIUM 40 MG/1
1 TABLET, FILM COATED ORAL DAILY
COMMUNITY
Start: 2019-12-20 | End: 2020-02-10

## 2019-12-23 NOTE — PROGRESS NOTES
"Cardiology Office Follow Up Visit      Primary Care Provider:  Coco Johnston, APRN    Reason for f/u: Atrial fibrillation    \"I do not feel well at all\", complaint of abdominal discomfort since hospitalization and intermittent headache       Coco Johnston, APRN    History of Present Illness     It was nice to see Facundo Maguire in follow-up for atrial fibrillation. She is a 74 y.o. female with a history of atrial fibrillation intolerant to Pradaxa, Xarelto, and Coumadin, and could not tolerate previous beta-blockers and Cardizem in the past.  Other chronic medical conditions include hypertension, fibromyalgia, psoriasis, and IBS.  She states that her irritable bowel syndrome increases in symptomology and pain every time she \"takes medications\".  She denies history of CVA/TIA or diabetes.  She continues to have uncontrollable atrial fibrillation, Dr. Kwon placed her on digoxin and warfarin in October, she states that she began having nausea and diarrhea and was uncertain which medication caused it and she discontinued taking both.  We discussed possible initiation of a beta-blocker during her last office visit.  She had been taking Prinivil with HCTZ due to increasing shortness of breath and cough and had resolution of the symptomology.  She discontinued taking the HCTZ, her shortness of breath continued to increase, and she presented to [place] emergency department where she was found to be in atrial fibrillation with rapid ventricular response.  She was given IV Cardizem, p.o. Lopressor, underwent a cardiac cath that demonstrated moderate RCA disease and subsequent JAVON cardioversion to sinus rhythm, I do not have the cardioversion report available for review.  We discussed potential watchman procedure, and had a lengthy discussion regarding the necessity of taking a anticoagulant for 45 days after implant and Plavix/aspirin for 6 months post implant.  She comes in today for routine follow-up for " posthospitalization and arrhythmia surveillance.  Unfortunately she has converted back to atrial fibrillation with relatively controlled response.  She states that she was discharged on Eliquis, Norvasc, and Lipitor, she states that her Prinivil was discontinued due to ongoing cough, she continues to take the prescribed medications but is uncertain as to which one is causing her abdominal discomfort.  She also states that her shortness of breath has increased again since her hospitalization, she denies chest pain, near syncope, or increased edema.    Social history: Denies smoking, denies EtOH, denies illicit use, denies herbal use.  Lives alone, daughter is involved in her care    6/2018 2D echo: Normal LV function with EF 55 to 60%, evidence of diastolic dysfunction, no valvular disease      Past Medical History:   Diagnosis Date   • Acute hypokalemia    • Arthritis    • Atrial fibrillation (CMS/HCC)    • Fibromyalgia    • Hypertension    • Palpitations        Past Surgical History:   Procedure Laterality Date   • TUBAL ABDOMINAL LIGATION           Current Outpatient Medications:   •  amLODIPine (NORVASC) 10 MG tablet, Take 10 mg by mouth Daily., Disp: , Rfl: 10  •  atorvastatin (LIPITOR) 40 MG tablet, Take 1 tablet by mouth Daily., Disp: , Rfl:   •  ELIQUIS 5 MG tablet tablet, Take 1 tablet by mouth 2 (Two) Times a Day., Disp: , Rfl:     Social History     Socioeconomic History   • Marital status:      Spouse name: Not on file   • Number of children: Not on file   • Years of education: Not on file   • Highest education level: Not on file   Tobacco Use   • Smoking status: Never Smoker   • Smokeless tobacco: Never Used   Substance and Sexual Activity   • Alcohol use: No     Frequency: Never   • Drug use: No       Family History   Problem Relation Age of Onset   • Thyroid disease Sister    • Cancer Brother        The following portions of the patient's history were reviewed and updated as appropriate:  allergies, current medications, past family history, past medical history, past social history, past surgical history and problem list.        Review of Systems   Constitution: Negative for diaphoresis, malaise/fatigue, weight gain and weight loss.   Cardiovascular: Negative for chest pain, dyspnea on exertion, leg swelling, near-syncope, orthopnea, palpitations and syncope.   Respiratory: Positive for cough and shortness of breath. Negative for hemoptysis, sputum production and wheezing.    Skin: Negative for rash.   Musculoskeletal: Positive for arthritis.   Gastrointestinal: Negative for abdominal pain, hematemesis, hematochezia, nausea and vomiting.   Neurological: Negative for dizziness, light-headedness, numbness and seizures.   Psychiatric/Behavioral: Negative.    All other systems reviewed and are negative.    /90   Pulse 118   Wt 84.8 kg (187 lb)   BMI 31.12 kg/m² .  Objective     Physical Exam   Constitutional: She is oriented to person, place, and time. She appears well-developed and well-nourished. She is cooperative.   Neck: Normal carotid pulses and no JVD present. Carotid bruit is not present.   Cardiovascular: Normal rate, regular rhythm, normal heart sounds and intact distal pulses. Exam reveals no gallop and no friction rub.   No murmur heard.  Pulses:       Carotid pulses are 2+ on the right side, and 2+ on the left side.       Radial pulses are 2+ on the right side, and 2+ on the left side.        Posterior tibial pulses are 2+ on the right side, and 2+ on the left side.   Pulmonary/Chest: Effort normal and breath sounds normal. She has no decreased breath sounds. She has no wheezes. She has no rhonchi. She has no rales.   Abdominal: Soft. Bowel sounds are normal. She exhibits no distension and no mass. There is no hepatosplenomegaly. There is no tenderness. There is no guarding.   Musculoskeletal: She exhibits no edema.   Neurological: She is alert and oriented to person, place, and  time.   Skin: Skin is warm and dry. No rash noted. No erythema. No pallor.   Psychiatric: She has a normal mood and affect. Her speech is normal and behavior is normal. Judgment and thought content normal.           ECG 12 Lead  Date/Time: 12/23/2019 3:24 PM  Performed by: Meg Grace APRN  Authorized by: Meg Grace APRN   Comparison: not compared with previous ECG   Rhythm: atrial fibrillation  BPM: 114          EDD-VASC score: 4   HAS-BLED score: 2    Assessment/Plan:     1.  Atrial fibrillation of unknown duration, recent DC cardioversion 12/2019 due to admission with AF RVR ------ intolerant of multiple rate control medications,  currently taking Eliquis  2.  Abdominal discomfort, history IBS  3.  Hypertension----currently elevated in the office, however she is complaining of abdominal discomfort  4.  Diastolic dysfunction----increased shortness of breath since hospitalization  5.  Fibromyalgia, chronic fatigue    Reiterated the importance of anticoagulation for stroke prevention with recurrent atrial fibrillation, the patient verbalized understanding and does not wish to pursue watchman discussion at this time.  Reiterated the need for her to follow-up with her GI physician with recurrent abdominal discomfort and to reinitiate Carafate until able to see them.  Start Lasix 20 mg p.o. daily for the next 3 days and then as needed for weight gain 3 pounds in 24 hours or 5 pounds in 72 hours, potassium to be taken with each Lasix dose.  Discussed Norvasc versus beta-blockade with Dr. Kwon, will continue Norvasc at this time as rate is less than 115.    SYBIL Gonzalez  EP cardiology  **>35 minutes in face to face conversation regarding treatment plan, education, and answering any questions the patient may have had

## 2019-12-26 ENCOUNTER — TELEPHONE (OUTPATIENT)
Dept: CARDIOLOGY | Facility: CLINIC | Age: 74
End: 2019-12-26

## 2019-12-26 NOTE — TELEPHONE ENCOUNTER
Pt called stating she was short of breath, and not feeling any better then when she was seen in office.  Pt wanted to increase lasix.  Meg, advised pt at last appt to monitor weight for three days.  Pt did not feel her scale was accurate to ours so she did not weigh herself.  Meg stated since pt is short of breath with some struggle when speaking she may be back in Afib rvr, so she advised pt to be seen at the er. I informed pt she states she understands.

## 2019-12-28 PROCEDURE — 93010 ELECTROCARDIOGRAM REPORT: CPT | Performed by: INTERNAL MEDICINE

## 2019-12-29 ENCOUNTER — OUTSIDE FACILITY SERVICE (OUTPATIENT)
Dept: CARDIOLOGY | Facility: CLINIC | Age: 74
End: 2019-12-29

## 2019-12-30 ENCOUNTER — OUTSIDE FACILITY SERVICE (OUTPATIENT)
Dept: CARDIOLOGY | Facility: CLINIC | Age: 74
End: 2019-12-30

## 2019-12-30 PROCEDURE — 99232 SBSQ HOSP IP/OBS MODERATE 35: CPT | Performed by: INTERNAL MEDICINE

## 2020-01-13 ENCOUNTER — OFFICE VISIT (OUTPATIENT)
Dept: CARDIOLOGY | Facility: CLINIC | Age: 75
End: 2020-01-13

## 2020-01-13 VITALS
WEIGHT: 183 LBS | RESPIRATION RATE: 18 BRPM | SYSTOLIC BLOOD PRESSURE: 128 MMHG | OXYGEN SATURATION: 94 % | BODY MASS INDEX: 29.41 KG/M2 | HEART RATE: 100 BPM | HEIGHT: 66 IN | DIASTOLIC BLOOD PRESSURE: 86 MMHG

## 2020-01-13 DIAGNOSIS — I50.43 ACUTE ON CHRONIC COMBINED SYSTOLIC AND DIASTOLIC CHF (CONGESTIVE HEART FAILURE) (HCC): Primary | ICD-10-CM

## 2020-01-13 DIAGNOSIS — I25.10 CORONARY ARTERY DISEASE INVOLVING NATIVE CORONARY ARTERY OF NATIVE HEART WITHOUT ANGINA PECTORIS: ICD-10-CM

## 2020-01-13 DIAGNOSIS — I48.11 LONGSTANDING PERSISTENT ATRIAL FIBRILLATION (HCC): ICD-10-CM

## 2020-01-13 DIAGNOSIS — I42.0 DILATED CARDIOMYOPATHY (HCC): ICD-10-CM

## 2020-01-13 PROCEDURE — 93000 ELECTROCARDIOGRAM COMPLETE: CPT | Performed by: INTERNAL MEDICINE

## 2020-01-13 PROCEDURE — 99214 OFFICE O/P EST MOD 30 MIN: CPT | Performed by: INTERNAL MEDICINE

## 2020-01-13 RX ORDER — LISINOPRIL 5 MG/1
5 TABLET ORAL DAILY
COMMUNITY
Start: 2020-01-02 | End: 2020-01-13

## 2020-01-13 RX ORDER — METOPROLOL SUCCINATE 25 MG/1
25 TABLET, EXTENDED RELEASE ORAL DAILY
COMMUNITY
End: 2020-03-11 | Stop reason: SDUPTHER

## 2020-01-13 RX ORDER — AMIODARONE HYDROCHLORIDE 200 MG/1
200 TABLET ORAL DAILY
COMMUNITY
End: 2020-03-11 | Stop reason: SDUPTHER

## 2020-01-13 RX ORDER — FUROSEMIDE 40 MG/1
40 TABLET ORAL 2 TIMES DAILY
COMMUNITY
Start: 2020-01-02 | End: 2020-02-04 | Stop reason: SDUPTHER

## 2020-01-13 RX ORDER — LOSARTAN POTASSIUM 25 MG/1
25 TABLET ORAL DAILY
Qty: 30 TABLET | Refills: 2 | Status: SHIPPED | OUTPATIENT
Start: 2020-01-13 | End: 2020-10-05

## 2020-01-13 NOTE — PROGRESS NOTES
Cardiology Office Visit      Encounter Date:  01/13/2020    Patient ID:   Facundo Maguire is a 74 y.o. female.    Reason For Followup:  Atrial fibrillation  Cardiomyopathy  Hypertension  Congestive heart failure  Coronary artery disease    Brief Clinical History:  Dear Dr. Johnston, April HOSSEIN, APRLYN    I had the pleasure of seeing Facundo Maguire today. As you are well aware, this is a 74 y.o. female with a past medical history that is significant for recent back-to-back hospitalization at [facility] for for symptoms of shortness of breath and congestive heart failure    Patient had a cardiac catheterization that showed mild to moderate obstructive coronary artery disease involving the LAD and right coronary artery  Patient also noted to be in cardiomyopathy with LV ejection fraction of 35 to 40% with pulmonary edema with the hospitalization for uncontrolled atrial fibrillation        Interval History:  Patient clinically feels better  Shortness of breath is better  Planing of some dry cough and also with occasional dizziness  Recent labs last week showing normal BMP but elevated BNP    Assessment & Plan    Impressions:  Atrial fibrillation  Cardiomyopathy  Hypertension  Congestive heart failure  Coronary artery disease  JAVON with LV ejection fraction of 35 to 40%  Systolic heart failure NYHA class III  Cardiac catheterization showing a mild to moderate obstructive coronary artery disease involving the LAD and right coronary artery  Failed cardioversion currently in atrial fibrillation with controlled ventricular response    Recommendations:  Switch patient from lisinopril to losartan secondary to dry cough  Start patient on losartan 12.5 mg p.o. once a day  Continue current dose of diuretics for 1 more week and repeat labs if the BNP is better plan to decrease the dose of Lasix to 40 mg p.o. once a day  Continue aggressive risk factor modification  Recheck LV systolic function next visit  Continue  "close monitoring  Continue current dose of beta-blocker and amiodarone for now  Further recommendations based on patient course  Home blood pressure monitoring  No evidence of any orthostasis on check in the office today  BMP and BNP in 1 week  Low up in office in 1 month    Objective:    Vitals:  Vitals:    01/13/20 1516   BP: 128/86   BP Location: Left arm   Pulse: 100   Resp: 18   SpO2: 94%   Weight: 83 kg (183 lb)   Height: 166.4 cm (65.5\")       Physical Exam:    General: Alert, cooperative, no distress, appears stated age  Head:  Normocephalic, atraumatic, mucous membranes moist  Eyes:  Conjunctiva/corneas clear, EOM's intact     Neck:  Supple,  no adenopathy;      Lungs: Clear to auscultation bilaterally, no wheezes rhonchi rales are noted  Chest wall: No tenderness  Heart::  Irregularly irregular heart rhythm S1-S2 normal displaced LV apical impulse 2 x 6 ejection systolic murmur  Abdomen: Soft, non-tender, nondistended bowel sounds active  Extremities: No cyanosis, clubbing, or edema  Pulses: 2+ and symmetric all extremities  Skin:  No rashes or lesions  Neuro/psych: A&O x3. CN II through XII are grossly intact with appropriate affect      Allergies:  Allergies   Allergen Reactions   • Cyclobenzaprine Palpitations   • Doxazosin Mesylate Other (See Comments)     itching   • Lisinopril Cough   • Tetracycline Nausea Only   • Valsartan Itching     Itching , constant urination , nausea    • Amiodarone Other (See Comments)   • Hydrochlorothiazide Other (See Comments)     Constant urination        Medication Review:     Current Outpatient Medications:   •  amiodarone (PACERONE) 200 MG tablet, Take 200 mg by mouth Daily., Disp: , Rfl:   •  atorvastatin (LIPITOR) 40 MG tablet, Take 1 tablet by mouth Daily., Disp: , Rfl:   •  ELIQUIS 5 MG tablet tablet, Take 1 tablet by mouth 2 (Two) Times a Day., Disp: , Rfl:   •  furosemide (LASIX) 40 MG tablet, Take 40 mg by mouth 2 (Two) Times a Day., Disp: , Rfl:   •  lisinopril " (PRINIVIL,ZESTRIL) 5 MG tablet, Take 5 mg by mouth Daily., Disp: , Rfl:   •  metoprolol succinate XL (TOPROL-XL) 25 MG 24 hr tablet, Take 25 mg by mouth Daily., Disp: , Rfl:   •  potassium chloride (K-DUR) 10 MEQ CR tablet, Take 1 tablet by mouth Daily. With each Lasix (furosemide) dose, Disp: 30 tablet, Rfl: 6    Family History:  Family History   Problem Relation Age of Onset   • Thyroid disease Sister    • Cancer Brother        Past Medical History:  Past Medical History:   Diagnosis Date   • Acute hypokalemia    • Arthritis    • Atrial fibrillation (CMS/HCC)    • Fibromyalgia    • Hypertension    • Palpitations        Past surgical History:  Past Surgical History:   Procedure Laterality Date   • TUBAL ABDOMINAL LIGATION         Social History:  Social History     Socioeconomic History   • Marital status:      Spouse name: Not on file   • Number of children: Not on file   • Years of education: Not on file   • Highest education level: Not on file   Tobacco Use   • Smoking status: Never Smoker   • Smokeless tobacco: Never Used   Substance and Sexual Activity   • Alcohol use: No     Frequency: Never   • Drug use: No       Review of Systems:  The following systems were reviewed as they relate to the cardiovascular system: Constitutional, Eyes, ENT, Cardiovascular, Respiratory, Gastrointestinal, Integumentary, Neurological, Psychiatric, Hematologic, Endocrine, Musculoskeletal, and Genitourinary. The pertinent cardiovascular findings are reported above with all other cardiovascular points within those systems being negative.    Diagnostic Study Review:     Current Electrocardiogram:    ECG 12 Lead  Date/Time: 1/13/2020 4:43 PM  Performed by: Tommie Coronado MD  Authorized by: Tommie Coronado MD   Comparison: compared with previous ECG   Similar to previous ECG  Rhythm: atrial fibrillation  Rate: normal  BPM: 100  Conduction: conduction normal  QRS axis: normal  Other findings: non-specific ST-T  wave changes    Clinical impression: abnormal EKG              NOTE: The following portions of the patient's history were reviewed and updated this visit as appropriate: allergies, current medications, past family history, past medical history, past social history, past surgical history and problem list.

## 2020-01-16 RX ORDER — POTASSIUM CHLORIDE 750 MG/1
TABLET, FILM COATED, EXTENDED RELEASE ORAL
Qty: 60 TABLET | Refills: 2 | Status: SHIPPED | OUTPATIENT
Start: 2020-01-16 | End: 2020-04-01 | Stop reason: SDUPTHER

## 2020-01-20 ENCOUNTER — LAB (OUTPATIENT)
Dept: FAMILY MEDICINE CLINIC | Facility: CLINIC | Age: 75
End: 2020-01-20

## 2020-01-20 DIAGNOSIS — I50.43 ACUTE ON CHRONIC COMBINED SYSTOLIC AND DIASTOLIC CHF (CONGESTIVE HEART FAILURE) (HCC): ICD-10-CM

## 2020-01-20 PROCEDURE — 80048 BASIC METABOLIC PNL TOTAL CA: CPT | Performed by: INTERNAL MEDICINE

## 2020-01-20 PROCEDURE — 83880 ASSAY OF NATRIURETIC PEPTIDE: CPT | Performed by: INTERNAL MEDICINE

## 2020-01-21 LAB
ANION GAP SERPL CALCULATED.3IONS-SCNC: 17.7 MMOL/L (ref 5–15)
BUN BLD-MCNC: 10 MG/DL (ref 8–23)
BUN/CREAT SERPL: 9.5 (ref 7–25)
CALCIUM SPEC-SCNC: 9.5 MG/DL (ref 8.6–10.5)
CHLORIDE SERPL-SCNC: 94 MMOL/L (ref 98–107)
CO2 SERPL-SCNC: 23.3 MMOL/L (ref 22–29)
CREAT BLD-MCNC: 1.05 MG/DL (ref 0.57–1)
GFR SERPL CREATININE-BSD FRML MDRD: 52 ML/MIN/1.73
GLUCOSE BLD-MCNC: 156 MG/DL (ref 65–99)
NT-PROBNP SERPL-MCNC: 2730 PG/ML (ref 5–900)
POTASSIUM BLD-SCNC: 3.9 MMOL/L (ref 3.5–5.2)
SODIUM BLD-SCNC: 135 MMOL/L (ref 136–145)

## 2020-01-29 ENCOUNTER — ANTICOAGULATION VISIT (OUTPATIENT)
Dept: CARDIOLOGY | Facility: CLINIC | Age: 75
End: 2020-01-29

## 2020-01-29 DIAGNOSIS — I48.0 PAF (PAROXYSMAL ATRIAL FIBRILLATION) (HCC): Primary | ICD-10-CM

## 2020-02-04 RX ORDER — FUROSEMIDE 40 MG/1
40 TABLET ORAL DAILY
Qty: 30 TABLET | Refills: 6 | Status: SHIPPED | OUTPATIENT
Start: 2020-02-04 | End: 2020-02-25 | Stop reason: SDUPTHER

## 2020-02-10 ENCOUNTER — OFFICE VISIT (OUTPATIENT)
Dept: CARDIOLOGY | Facility: CLINIC | Age: 75
End: 2020-02-10

## 2020-02-10 VITALS
HEIGHT: 66 IN | DIASTOLIC BLOOD PRESSURE: 80 MMHG | BODY MASS INDEX: 30.15 KG/M2 | OXYGEN SATURATION: 98 % | HEART RATE: 89 BPM | WEIGHT: 187.6 LBS | SYSTOLIC BLOOD PRESSURE: 131 MMHG

## 2020-02-10 DIAGNOSIS — I10 ESSENTIAL HYPERTENSION: ICD-10-CM

## 2020-02-10 DIAGNOSIS — I48.11 LONGSTANDING PERSISTENT ATRIAL FIBRILLATION (HCC): ICD-10-CM

## 2020-02-10 DIAGNOSIS — I50.23 ACUTE ON CHRONIC SYSTOLIC CHF (CONGESTIVE HEART FAILURE) (HCC): Primary | ICD-10-CM

## 2020-02-10 DIAGNOSIS — E78.5 HYPERLIPIDEMIA LDL GOAL <70: ICD-10-CM

## 2020-02-10 PROCEDURE — 99214 OFFICE O/P EST MOD 30 MIN: CPT | Performed by: INTERNAL MEDICINE

## 2020-02-10 PROCEDURE — 93000 ELECTROCARDIOGRAM COMPLETE: CPT | Performed by: INTERNAL MEDICINE

## 2020-02-10 NOTE — PROGRESS NOTES
Cardiology Office Visit      Encounter Date:  02/10/2020    Patient ID:   Facundo Maguire is a 74 y.o. female.      Reason For Followup:  Atrial fibrillation  Cardiomyopathy  Hypertension  Congestive heart failure  Coronary artery disease    Brief Clinical History:  Dear Dr. Johnston, April L, APRN    I had the pleasure of seeing Facundo Maguire today. As you are well aware, this is a 74 y.o. female with a past medical history that is significant for recent back-to-back hospitalization at [facility] for for symptoms of shortness of breath and congestive heart failure    Patient had a cardiac catheterization that showed mild to moderate obstructive coronary artery disease involving the LAD and right coronary artery  Patient also noted to be in cardiomyopathy with LV ejection fraction of 35 to 40% with pulmonary edema with the hospitalization for uncontrolled atrial fibrillation        Interval History:  Complaining of some more shortness of breath and weight gain total 5 pounds  Denies any chest pain no dizziness no syncope    Assessment & Plan    Impressions:  Atrial fibrillation  Cardiomyopathy  Hypertension  Congestive heart failure  Coronary artery disease  JAVON with LV ejection fraction of 35 to 40%  Systolic heart failure NYHA class III  Cardiac catheterization showing a mild to moderate obstructive coronary artery disease involving the LAD and right coronary artery  Failed cardioversion currently in atrial fibrillation with controlled ventricular response    Recommendations:  Continue losartan 25 mg p.o. once a day  Increase the dose of Lasix to 40 mg p.o. twice daily for 3 to 4 days and then cut back to 40 once a day  And potassium 10 mg p.o. once a day  Continue aggressive risk factor modification  Recheck LV systolic function before next visit  Continue close monitoring  Continue current dose of beta-blocker and amiodarone for now  Further recommendations based on patient course  Home blood  "pressure monitoring  Follow-up in office in 2 months  Objective:    Vitals:  Vitals:    02/10/20 1342   BP: 131/80   BP Location: Left arm   Pulse: 89   SpO2: 98%   Weight: 85.1 kg (187 lb 9.6 oz)   Height: 166.4 cm (65.5\")       Physical Exam:    General: Alert, cooperative, no distress, appears stated age  Head:  Normocephalic, atraumatic, mucous membranes moist  Eyes:  Conjunctiva/corneas clear, EOM's intact     Neck:  Supple,  no adenopathy; mild JVP elevation  Lungs: Clear to auscultation bilaterally, no wheezes rhonchi rales are noted  Chest wall: No tenderness  Heart::  Regular rate and rhythm, S1 and S2 normal, no murmur, rub or gallop  Abdomen: Soft, non-tender, nondistended bowel sounds active  Extremities: No cyanosis, clubbing, or edema  Pulses: 2+ and symmetric all extremities  Skin:  No rashes or lesions  Neuro/psych: A&O x3. CN II through XII are grossly intact with appropriate affect      Allergies:  Allergies   Allergen Reactions   • Cyclobenzaprine Palpitations   • Doxazosin Mesylate Other (See Comments)     itching   • Lisinopril Cough   • Tetracycline Nausea Only   • Valsartan Itching     Itching , constant urination , nausea    • Hydrochlorothiazide Other (See Comments)     Constant urination        Medication Review:     Current Outpatient Medications:   •  amiodarone (PACERONE) 200 MG tablet, Take 200 mg by mouth Daily., Disp: , Rfl:   •  ELIQUIS 5 MG tablet tablet, Take 1 tablet by mouth 2 (Two) Times a Day., Disp: , Rfl:   •  furosemide (LASIX) 40 MG tablet, Take 1 tablet by mouth Daily., Disp: 30 tablet, Rfl: 6  •  losartan (COZAAR) 25 MG tablet, Take 1 tablet by mouth Daily., Disp: 30 tablet, Rfl: 2  •  metoprolol succinate XL (TOPROL-XL) 25 MG 24 hr tablet, Take 25 mg by mouth Daily., Disp: , Rfl:   •  potassium chloride (K-DUR) 10 MEQ CR tablet, With each Lasix (furosemide) dose, Disp: 60 tablet, Rfl: 2    Family History:  Family History   Problem Relation Age of Onset   • Thyroid " disease Sister    • Cancer Brother        Past Medical History:  Past Medical History:   Diagnosis Date   • Acute hypokalemia    • Arthritis    • Atrial fibrillation (CMS/HCC)    • Fibromyalgia    • Hypertension    • Palpitations        Past surgical History:  Past Surgical History:   Procedure Laterality Date   • TUBAL ABDOMINAL LIGATION         Social History:  Social History     Socioeconomic History   • Marital status:      Spouse name: Not on file   • Number of children: Not on file   • Years of education: Not on file   • Highest education level: Not on file   Tobacco Use   • Smoking status: Never Smoker   • Smokeless tobacco: Never Used   Substance and Sexual Activity   • Alcohol use: No     Frequency: Never   • Drug use: No       Review of Systems:  The following systems were reviewed as they relate to the cardiovascular system: Constitutional, Eyes, ENT, Cardiovascular, Respiratory, Gastrointestinal, Integumentary, Neurological, Psychiatric, Hematologic, Endocrine, Musculoskeletal, and Genitourinary. The pertinent cardiovascular findings are reported above with all other cardiovascular points within those systems being negative.    Diagnostic Study Review:     Current Electrocardiogram:    ECG 12 Lead  Date/Time: 2/10/2020 3:16 PM  Performed by: Tommie Coronado MD  Authorized by: Tommie Coronado MD   Rhythm: atrial fibrillation  Rate: normal  BPM: 89  Conduction: conduction normal  QRS axis: normal  Other findings: non-specific ST-T wave changes    Clinical impression: abnormal EKG              NOTE: The following portions of the patient's history were reviewed and updated this visit as appropriate: allergies, current medications, past family history, past medical history, past social history, past surgical history and problem list.

## 2020-02-25 ENCOUNTER — TELEPHONE (OUTPATIENT)
Dept: CARDIOLOGY | Facility: CLINIC | Age: 75
End: 2020-02-25

## 2020-02-25 RX ORDER — FUROSEMIDE 40 MG/1
40 TABLET ORAL 2 TIMES DAILY
Qty: 60 TABLET | Refills: 6 | Status: SHIPPED | OUTPATIENT
Start: 2020-02-25 | End: 2020-04-23 | Stop reason: SDUPTHER

## 2020-02-25 RX ORDER — FUROSEMIDE 40 MG/1
40 TABLET ORAL DAILY
Qty: 30 TABLET | Refills: 6 | Status: SHIPPED | OUTPATIENT
Start: 2020-02-25 | End: 2020-02-25 | Stop reason: SDUPTHER

## 2020-02-25 NOTE — TELEPHONE ENCOUNTER
Pt called requesting a refill on Lasix. Pt states she is taking 40mg- 1 po BID and that she hasn't lost any weight since her appt with Dr. Villavicencio on 2/10, but that she hasn't gained anymore weight either. She is questioning if she needs to continue taking Lasix 40mg- 1 po BID? Per Dr. Villavicencio continue Lasix 40mg 1 po BID. I informed the Pt and she stated understanding. Rx sent.

## 2020-03-11 RX ORDER — METOPROLOL SUCCINATE 25 MG/1
25 TABLET, EXTENDED RELEASE ORAL DAILY
Qty: 30 TABLET | Refills: 6 | Status: SHIPPED | OUTPATIENT
Start: 2020-03-11 | End: 2020-09-10 | Stop reason: SDUPTHER

## 2020-03-11 RX ORDER — AMIODARONE HYDROCHLORIDE 200 MG/1
200 TABLET ORAL DAILY
Qty: 30 TABLET | Refills: 6 | Status: SHIPPED | OUTPATIENT
Start: 2020-03-11 | End: 2020-06-29

## 2020-04-01 ENCOUNTER — TELEPHONE (OUTPATIENT)
Dept: CARDIOLOGY | Facility: CLINIC | Age: 75
End: 2020-04-01

## 2020-04-01 DIAGNOSIS — I10 ESSENTIAL HYPERTENSION: Primary | ICD-10-CM

## 2020-04-01 RX ORDER — POTASSIUM CHLORIDE 750 MG/1
TABLET, FILM COATED, EXTENDED RELEASE ORAL
Qty: 60 TABLET | Refills: 5 | Status: SHIPPED | OUTPATIENT
Start: 2020-04-01 | End: 2020-04-21 | Stop reason: SDUPTHER

## 2020-04-01 NOTE — TELEPHONE ENCOUNTER
Pt called stating that she has been taking K+ 10MEQ- 1 po BID since she is taking Lasix  40mg- 1 po BID. She is requesting a new Rx for K+. Per Dr. Saud deshpande to send new Rx for K+10MEQ- 1 po BID and have the Pt have a BMP done in 1-2 weeks. I informed the Pt of this and she stated understanding.

## 2020-04-16 ENCOUNTER — LAB (OUTPATIENT)
Dept: FAMILY MEDICINE CLINIC | Facility: CLINIC | Age: 75
End: 2020-04-16

## 2020-04-16 DIAGNOSIS — I10 ESSENTIAL HYPERTENSION: ICD-10-CM

## 2020-04-16 DIAGNOSIS — I50.23 ACUTE ON CHRONIC SYSTOLIC CHF (CONGESTIVE HEART FAILURE) (HCC): ICD-10-CM

## 2020-04-16 DIAGNOSIS — E78.5 HYPERLIPIDEMIA LDL GOAL <70: ICD-10-CM

## 2020-04-16 PROCEDURE — 80053 COMPREHEN METABOLIC PANEL: CPT | Performed by: INTERNAL MEDICINE

## 2020-04-16 PROCEDURE — 80061 LIPID PANEL: CPT | Performed by: INTERNAL MEDICINE

## 2020-04-16 PROCEDURE — 36415 COLL VENOUS BLD VENIPUNCTURE: CPT | Performed by: INTERNAL MEDICINE

## 2020-04-16 PROCEDURE — 84443 ASSAY THYROID STIM HORMONE: CPT | Performed by: INTERNAL MEDICINE

## 2020-04-17 ENCOUNTER — HOSPITAL ENCOUNTER (OUTPATIENT)
Dept: CARDIOLOGY | Facility: HOSPITAL | Age: 75
Discharge: HOME | End: 2020-04-17
Admitting: INTERNAL MEDICINE

## 2020-04-17 VITALS
HEART RATE: 103 BPM | DIASTOLIC BLOOD PRESSURE: 90 MMHG | SYSTOLIC BLOOD PRESSURE: 159 MMHG | BODY MASS INDEX: 31.82 KG/M2 | WEIGHT: 191 LBS | RESPIRATION RATE: 20 BRPM | HEIGHT: 65 IN

## 2020-04-17 DIAGNOSIS — I50.23 ACUTE ON CHRONIC SYSTOLIC CHF (CONGESTIVE HEART FAILURE) (HCC): ICD-10-CM

## 2020-04-17 LAB
ALBUMIN SERPL-MCNC: 4.5 G/DL (ref 3.5–5.2)
ALBUMIN/GLOB SERPL: 1.2 G/DL
ALP SERPL-CCNC: 85 U/L (ref 39–117)
ALT SERPL W P-5'-P-CCNC: 23 U/L (ref 1–33)
ANION GAP SERPL CALCULATED.3IONS-SCNC: 13.8 MMOL/L (ref 5–15)
AST SERPL-CCNC: 25 U/L (ref 1–32)
BILIRUB SERPL-MCNC: 0.8 MG/DL (ref 0.2–1.2)
BUN BLD-MCNC: 18 MG/DL (ref 8–23)
BUN/CREAT SERPL: 16.1 (ref 7–25)
CALCIUM SPEC-SCNC: 9.9 MG/DL (ref 8.6–10.5)
CHLORIDE SERPL-SCNC: 97 MMOL/L (ref 98–107)
CHOLEST SERPL-MCNC: 198 MG/DL (ref 0–200)
CO2 SERPL-SCNC: 29.2 MMOL/L (ref 22–29)
CREAT BLD-MCNC: 1.12 MG/DL (ref 0.57–1)
GFR SERPL CREATININE-BSD FRML MDRD: 48 ML/MIN/1.73
GLOBULIN UR ELPH-MCNC: 3.8 GM/DL
GLUCOSE BLD-MCNC: 115 MG/DL (ref 65–99)
HDLC SERPL-MCNC: 68 MG/DL (ref 40–60)
LDLC SERPL CALC-MCNC: 107 MG/DL (ref 0–100)
LDLC/HDLC SERPL: 1.57 {RATIO}
POTASSIUM BLD-SCNC: 4.5 MMOL/L (ref 3.5–5.2)
PROT SERPL-MCNC: 8.3 G/DL (ref 6–8.5)
SODIUM BLD-SCNC: 140 MMOL/L (ref 136–145)
TRIGL SERPL-MCNC: 115 MG/DL (ref 0–150)
TSH SERPL DL<=0.05 MIU/L-ACNC: 5.08 UIU/ML (ref 0.27–4.2)
VLDLC SERPL-MCNC: 23 MG/DL (ref 5–40)

## 2020-04-17 PROCEDURE — 93306 TTE W/DOPPLER COMPLETE: CPT

## 2020-04-20 ENCOUNTER — OFFICE VISIT (OUTPATIENT)
Dept: CARDIOLOGY | Facility: CLINIC | Age: 75
End: 2020-04-20

## 2020-04-20 VITALS
WEIGHT: 192.6 LBS | SYSTOLIC BLOOD PRESSURE: 138 MMHG | HEIGHT: 65 IN | DIASTOLIC BLOOD PRESSURE: 89 MMHG | BODY MASS INDEX: 32.09 KG/M2

## 2020-04-20 DIAGNOSIS — I27.20 PULMONARY HYPERTENSION (HCC): Primary | ICD-10-CM

## 2020-04-20 DIAGNOSIS — R00.2 PALPITATIONS: ICD-10-CM

## 2020-04-20 DIAGNOSIS — I48.91 ATRIAL FIBRILLATION WITH RVR (HCC): ICD-10-CM

## 2020-04-20 DIAGNOSIS — I10 ESSENTIAL HYPERTENSION: ICD-10-CM

## 2020-04-20 LAB
BH CV ECHO MEAS - ACS: 1.6 CM
BH CV ECHO MEAS - AO MAX PG (FULL): 7.4 MMHG
BH CV ECHO MEAS - AO MAX PG: 15.4 MMHG
BH CV ECHO MEAS - AO MEAN PG (FULL): 3.9 MMHG
BH CV ECHO MEAS - AO MEAN PG: 8.7 MMHG
BH CV ECHO MEAS - AO ROOT AREA (BSA CORRECTED): 1.3
BH CV ECHO MEAS - AO ROOT AREA: 5 CM^2
BH CV ECHO MEAS - AO ROOT DIAM: 2.5 CM
BH CV ECHO MEAS - AO V2 MAX: 196.1 CM/SEC
BH CV ECHO MEAS - AO V2 MEAN: 139.6 CM/SEC
BH CV ECHO MEAS - AO V2 VTI: 35.2 CM
BH CV ECHO MEAS - ASC AORTA: 2.6 CM
BH CV ECHO MEAS - AVA(I,A): 2.7 CM^2
BH CV ECHO MEAS - AVA(I,D): 2.7 CM^2
BH CV ECHO MEAS - AVA(V,A): 2.7 CM^2
BH CV ECHO MEAS - AVA(V,D): 2.7 CM^2
BH CV ECHO MEAS - BSA(HAYCOCK): 2 M^2
BH CV ECHO MEAS - BSA: 1.9 M^2
BH CV ECHO MEAS - BZI_BMI: 31.8 KILOGRAMS/M^2
BH CV ECHO MEAS - BZI_METRIC_HEIGHT: 165.1 CM
BH CV ECHO MEAS - BZI_METRIC_WEIGHT: 86.6 KG
BH CV ECHO MEAS - EDV(CUBED): 105.3 ML
BH CV ECHO MEAS - EDV(MOD-SP2): 30.8 ML
BH CV ECHO MEAS - EDV(MOD-SP4): 85.4 ML
BH CV ECHO MEAS - EDV(TEICH): 103.5 ML
BH CV ECHO MEAS - EF(CUBED): 55.7 %
BH CV ECHO MEAS - EF(MOD-BP): 51 %
BH CV ECHO MEAS - EF(MOD-SP2): 42.6 %
BH CV ECHO MEAS - EF(MOD-SP4): 57 %
BH CV ECHO MEAS - EF(TEICH): 47.5 %
BH CV ECHO MEAS - ESV(CUBED): 46.6 ML
BH CV ECHO MEAS - ESV(MOD-SP2): 17.7 ML
BH CV ECHO MEAS - ESV(MOD-SP4): 36.7 ML
BH CV ECHO MEAS - ESV(TEICH): 54.4 ML
BH CV ECHO MEAS - FS: 23.8 %
BH CV ECHO MEAS - IVS/LVPW: 0.61
BH CV ECHO MEAS - IVSD: 0.78 CM
BH CV ECHO MEAS - LA DIMENSION(2D): 3.6 CM
BH CV ECHO MEAS - LV DIASTOLIC VOL/BSA (35-75): 44 ML/M^2
BH CV ECHO MEAS - LV MASS(C)D: 170.9 GRAMS
BH CV ECHO MEAS - LV MASS(C)DI: 88.1 GRAMS/M^2
BH CV ECHO MEAS - LV MAX PG: 8 MMHG
BH CV ECHO MEAS - LV MEAN PG: 4.7 MMHG
BH CV ECHO MEAS - LV SYSTOLIC VOL/BSA (12-30): 18.9 ML/M^2
BH CV ECHO MEAS - LV V1 MAX: 140.3 CM/SEC
BH CV ECHO MEAS - LV V1 MEAN: 100.7 CM/SEC
BH CV ECHO MEAS - LV V1 VTI: 25.1 CM
BH CV ECHO MEAS - LVIDD: 4.7 CM
BH CV ECHO MEAS - LVIDS: 3.6 CM
BH CV ECHO MEAS - LVOT AREA: 3.8 CM^2
BH CV ECHO MEAS - LVOT DIAM: 2.2 CM
BH CV ECHO MEAS - LVPWD: 1.3 CM
BH CV ECHO MEAS - MR MAX PG: 64.4 MMHG
BH CV ECHO MEAS - MR MAX VEL: 401.2 CM/SEC
BH CV ECHO MEAS - MV MAX PG: 7.7 MMHG
BH CV ECHO MEAS - MV MEAN PG: 3.4 MMHG
BH CV ECHO MEAS - MV V2 MAX: 139 CM/SEC
BH CV ECHO MEAS - MV V2 MEAN: 85.5 CM/SEC
BH CV ECHO MEAS - MV V2 VTI: 17.9 CM
BH CV ECHO MEAS - MVA(VTI): 5.3 CM^2
BH CV ECHO MEAS - PA ACC TIME: 0.12 SEC
BH CV ECHO MEAS - PA MAX PG (FULL): 2.5 MMHG
BH CV ECHO MEAS - PA MAX PG: 4.2 MMHG
BH CV ECHO MEAS - PA MEAN PG (FULL): 1.3 MMHG
BH CV ECHO MEAS - PA MEAN PG: 2 MMHG
BH CV ECHO MEAS - PA PR(ACCEL): 23.2 MMHG
BH CV ECHO MEAS - PA V2 MAX: 102.2 CM/SEC
BH CV ECHO MEAS - PA V2 MEAN: 67.6 CM/SEC
BH CV ECHO MEAS - PA V2 VTI: 18.9 CM
BH CV ECHO MEAS - PVA(I,A): 4.3 CM^2
BH CV ECHO MEAS - PVA(I,D): 4.3 CM^2
BH CV ECHO MEAS - PVA(V,A): 4.1 CM^2
BH CV ECHO MEAS - PVA(V,D): 4.1 CM^2
BH CV ECHO MEAS - QP/QS: 0.85
BH CV ECHO MEAS - RAP SYSTOLE: 3 MMHG
BH CV ECHO MEAS - RV MAX PG: 1.7 MMHG
BH CV ECHO MEAS - RV MEAN PG: 0.72 MMHG
BH CV ECHO MEAS - RV V1 MAX: 65.4 CM/SEC
BH CV ECHO MEAS - RV V1 MEAN: 39 CM/SEC
BH CV ECHO MEAS - RV V1 VTI: 12.7 CM
BH CV ECHO MEAS - RVAW: 2.6 CM
BH CV ECHO MEAS - RVOT AREA: 6.4 CM^2
BH CV ECHO MEAS - RVOT DIAM: 2.9 CM
BH CV ECHO MEAS - RVSP: 30.5 MMHG
BH CV ECHO MEAS - SI(AO): 90.3 ML/M^2
BH CV ECHO MEAS - SI(CUBED): 30.2 ML/M^2
BH CV ECHO MEAS - SI(LVOT): 49.2 ML/M^2
BH CV ECHO MEAS - SI(MOD-SP2): 6.8 ML/M^2
BH CV ECHO MEAS - SI(MOD-SP4): 25.1 ML/M^2
BH CV ECHO MEAS - SI(TEICH): 25.3 ML/M^2
BH CV ECHO MEAS - SV(AO): 175.1 ML
BH CV ECHO MEAS - SV(CUBED): 58.7 ML
BH CV ECHO MEAS - SV(LVOT): 95.4 ML
BH CV ECHO MEAS - SV(MOD-SP2): 13.1 ML
BH CV ECHO MEAS - SV(MOD-SP4): 48.7 ML
BH CV ECHO MEAS - SV(RVOT): 81.1 ML
BH CV ECHO MEAS - SV(TEICH): 49.1 ML
BH CV ECHO MEAS - TR MAX VEL: 256.6 CM/SEC
LV EF 2D ECHO EST: 50 %

## 2020-04-20 PROCEDURE — 99443 PR PHYS/QHP TELEPHONE EVALUATION 21-30 MIN: CPT | Performed by: INTERNAL MEDICINE

## 2020-04-20 PROCEDURE — 93306 TTE W/DOPPLER COMPLETE: CPT | Performed by: INTERNAL MEDICINE

## 2020-04-20 RX ORDER — LANSOPRAZOLE 30 MG/1
30 CAPSULE, DELAYED RELEASE ORAL DAILY
COMMUNITY

## 2020-04-20 NOTE — PROGRESS NOTES
Cardiology Telephone Visit    Encounter Date:  04/20/2020    Patient ID:   Facundo Maguire is a 74 y.o. female.    Reason For Followup:  Atrial fibrillation  Cardiomyopathy  Hypertension  Congestive heart failure  Coronary artery disease    Brief Clinical History:  Dear Dr. Johnston, April L, APRN    I had the pleasure of seeing Facundo Maguire today. As you are well aware, this is a 74 y.o. female with a past medical history that is significant for recent back-to-back hospitalization at [facility] for for symptoms of shortness of breath and congestive heart failure    Patient had a cardiac catheterization that showed mild to moderate obstructive coronary artery disease involving the LAD and right coronary artery  Patient also noted to be in cardiomyopathy with LV ejection fraction of 35 to 40% with pulmonary edema with the hospitalization for uncontrolled atrial fibrillation        Interval History:  Shortness of breath is better  Denies any chest pain no dizziness no syncope    Assessment & Plan    Impressions:  Atrial fibrillation  Cardiomyopathy  Hypertension  Congestive heart failure  Coronary artery disease  JAVON with LV ejection fraction of 35 to 40%  Systolic heart failure NYHA class III  Cardiac catheterization showing a mild to moderate obstructive coronary artery disease involving the LAD and right coronary artery  Failed cardioversion currently in atrial fibrillation with controlled ventricular response    Recommendations:  Continue losartan 25 mg p.o. once a day  Continue Lasix to 40 mg p.o. twice daily   Continue potassium 10 mg p.o. twice a day  Continue aggressive risk factor modification  Repeat echocardiogram with LV ejection fraction of 50%  Labs discussed with the patient  Need for aggressive risk factor modification regular exercise and weight loss discussed with the patient  Continue close monitoring  Continue current dose of beta-blocker and amiodarone for now  Further  "recommendations based on patient course  Home blood pressure monitoring  Repeat EKG to reassess if patient is still in atrial fibrillation next office visit  Labs including lipids and thyroid function discussed with the patient  Follow-up in office in 2 months    This is a telephone visit  Patient gave consent for telephone visit  You have chosen to receive care through a telephone visit. Do you consent to use a telephone visit for your medical care today? Yes      Vitals:  Vitals:    04/20/20 1335   BP: 138/89   BP Location: Left arm   Weight: 87.4 kg (192 lb 9.6 oz)   Height: 165.1 cm (65\")       Allergies:  Allergies   Allergen Reactions   • Cyclobenzaprine Palpitations   • Doxazosin Mesylate Other (See Comments)     itching   • Lisinopril Cough   • Tetracycline Nausea Only   • Valsartan Itching     Itching , constant urination , nausea    • Hydrochlorothiazide Other (See Comments)     Constant urination        Medication Review:     Current Outpatient Medications:   •  amiodarone (PACERONE) 200 MG tablet, Take 1 tablet by mouth Daily., Disp: 30 tablet, Rfl: 6  •  ELIQUIS 5 MG tablet tablet, Take 1 tablet by mouth 2 (Two) Times a Day., Disp: , Rfl:   •  furosemide (LASIX) 40 MG tablet, Take 1 tablet by mouth 2 (Two) Times a Day., Disp: 60 tablet, Rfl: 6  •  lansoprazole (PREVACID) 30 MG capsule, Take 30 mg by mouth Daily., Disp: , Rfl:   •  losartan (COZAAR) 25 MG tablet, Take 1 tablet by mouth Daily., Disp: 30 tablet, Rfl: 2  •  metoprolol succinate XL (TOPROL-XL) 25 MG 24 hr tablet, Take 1 tablet by mouth Daily., Disp: 30 tablet, Rfl: 6  •  potassium chloride (K-DUR) 10 MEQ CR tablet, With each Lasix (furosemide) dose (Patient taking differently: 10 mEq 2 (Two) Times a Day. With each Lasix (furosemide) dose), Disp: 60 tablet, Rfl: 5    Family History:  Family History   Problem Relation Age of Onset   • Thyroid disease Sister    • Cancer Brother        Past Medical History:  Past Medical History:   Diagnosis " Date   • Acute hypokalemia    • Arthritis    • Atrial fibrillation (CMS/HCC)    • Fibromyalgia    • Hypertension    • Palpitations        Past surgical History:  Past Surgical History:   Procedure Laterality Date   • TUBAL ABDOMINAL LIGATION         Social History:  Social History     Socioeconomic History   • Marital status:      Spouse name: Not on file   • Number of children: Not on file   • Years of education: Not on file   • Highest education level: Not on file   Tobacco Use   • Smoking status: Never Smoker   • Smokeless tobacco: Never Used   Substance and Sexual Activity   • Alcohol use: No     Frequency: Never   • Drug use: No   • Sexual activity: Defer       Review of Systems:  The following systems were reviewed as they relate to the cardiovascular system: Constitutional, Eyes, ENT, Cardiovascular, Respiratory, Gastrointestinal, Integumentary, Neurological, Psychiatric, Hematologic, Endocrine, Musculoskeletal, and Genitourinary. The pertinent cardiovascular findings are reported above with all other cardiovascular points within those systems being negative.    Diagnostic Study Review:     Current Electrocardiogram:  Procedures      NOTE: The following portions of the patient's history were reviewed and updated this visit as appropriate: allergies, current medications, past family history, past medical history, past social history, past surgical history and problem list.    A total of 25 minutes of medical discussion occurred during this encounter.      Novel Coronavirus (COVID-19) Disclaimer:     A proclamation declaring a national emergency concerning the Novel Coronavirus Disease (COVID-19) Outbreak was issued on March 13, 2020 at the direction of the .    This virtual visit was performed with the patient's consent in lieu of the patient's regularly scheduled appointment in order to provide the patient with the opportunity to maintain contact with their healthcare  provider while also adhering to social distancing guidelines set forth by the CDC to reduce exposure to the Novel Coronavirus (COVID-19).  Any vital signs obtained during this visit were provided by the patient.

## 2020-04-21 RX ORDER — POTASSIUM CHLORIDE 750 MG/1
10 TABLET, FILM COATED, EXTENDED RELEASE ORAL 2 TIMES DAILY
Qty: 60 TABLET | Refills: 6 | Status: SHIPPED | OUTPATIENT
Start: 2020-04-21 | End: 2021-04-20

## 2020-04-23 RX ORDER — FUROSEMIDE 40 MG/1
40 TABLET ORAL 2 TIMES DAILY
Qty: 60 TABLET | Refills: 6 | Status: SHIPPED | OUTPATIENT
Start: 2020-04-23 | End: 2021-03-08

## 2020-05-05 RX ORDER — APIXABAN 5 MG/1
5 TABLET, FILM COATED ORAL 2 TIMES DAILY
Qty: 180 TABLET | Refills: 3 | Status: SHIPPED | OUTPATIENT
Start: 2020-05-05 | End: 2021-03-22 | Stop reason: SDUPTHER

## 2020-06-29 ENCOUNTER — OFFICE VISIT (OUTPATIENT)
Dept: CARDIOLOGY | Facility: CLINIC | Age: 75
End: 2020-06-29

## 2020-06-29 VITALS
BODY MASS INDEX: 31.65 KG/M2 | HEART RATE: 96 BPM | WEIGHT: 190 LBS | RESPIRATION RATE: 18 BRPM | SYSTOLIC BLOOD PRESSURE: 159 MMHG | DIASTOLIC BLOOD PRESSURE: 81 MMHG | OXYGEN SATURATION: 96 % | HEIGHT: 65 IN

## 2020-06-29 DIAGNOSIS — I10 ESSENTIAL HYPERTENSION: ICD-10-CM

## 2020-06-29 DIAGNOSIS — R00.2 PALPITATIONS: ICD-10-CM

## 2020-06-29 DIAGNOSIS — I27.20 PULMONARY HYPERTENSION (HCC): ICD-10-CM

## 2020-06-29 DIAGNOSIS — I25.10 CORONARY ARTERY DISEASE INVOLVING NATIVE CORONARY ARTERY OF NATIVE HEART WITHOUT ANGINA PECTORIS: ICD-10-CM

## 2020-06-29 DIAGNOSIS — I48.91 ATRIAL FIBRILLATION WITH RVR (HCC): Primary | ICD-10-CM

## 2020-06-29 PROCEDURE — 99214 OFFICE O/P EST MOD 30 MIN: CPT | Performed by: INTERNAL MEDICINE

## 2020-06-29 PROCEDURE — 93000 ELECTROCARDIOGRAM COMPLETE: CPT | Performed by: INTERNAL MEDICINE

## 2020-06-29 NOTE — PROGRESS NOTES
Cardiology Office Visit      Encounter Date:  06/29/2020    Patient ID:   Facundo Maguire is a 74 y.o. female.    Reason For Followup:  Atrial fibrillation  Cardiomyopathy  Hypertension  Congestive heart failure  Coronary artery disease    Brief Clinical History:  Dear Dr. Johnston, April L, APRN    I had the pleasure of seeing Facundo Maguire today. As you are well aware, this is a 74 y.o. female with a past medical history that is significant for recent back-to-back hospitalization at [facility] for for symptoms of shortness of breath and congestive heart failure    Patient had a cardiac catheterization that showed mild to moderate obstructive coronary artery disease involving the LAD and right coronary artery  Patient also noted to be in cardiomyopathy with LV ejection fraction of 35 to 40% with pulmonary edema with the hospitalization for uncontrolled atrial fibrillation        Interval History:  Shortness of breath is better  Denies any chest pain no dizziness no syncope  Denies any dizziness or syncope  Compliant with medical  Lower extremity edema is better  Assessment & Plan    Impressions:  Atrial fibrillation  Cardiomyopathy  Hypertension  Congestive heart failure  Coronary artery disease  JAVON with LV ejection fraction of 35 to 40% Anders echocardiogram with LV ejection fraction of 50%  Systolic heart failure NYHA class III  Cardiac catheterization showing a mild to moderate obstructive coronary artery disease involving the LAD and right coronary artery  Failed cardioversion currently in atrial fibrillation with controlled ventricular response  Still in atrial fibrillation with controlled ventricular response  Impaired fasting glucose    Recommendations:  Continue losartan 25 mg p.o. once a day  Decrease the dose of potassium to 10 once a day  Decrease the dose of Lasix to 40 mg p.o. once a day  Continue aggressive risk factor modification  Repeat echocardiogram with LV ejection fraction of  "50%  Labs discussed with the patient  Need for aggressive risk factor modification regular exercise and weight loss discussed with the patient  Continue close monitoring  Continue current dose of beta-blocker   Continue amiodarone because of not maintaining sinus rhythm and also patient having some side effect  Another option for cardioversion for a second attempt discussed with the patient but patient is not interested at this time  Further recommendations based on patient course  Home blood pressure monitoring  Labs discussed with patient  Follow-up in office in 3 months    Objective:    Vitals:  Vitals:    06/29/20 0955   BP: 159/81   BP Location: Left arm   Pulse: 96   Resp: 18   SpO2: 96%   Weight: 86.2 kg (190 lb)   Height: 165.1 cm (65\")       Physical Exam:    General: Alert, cooperative, no distress, appears stated age  Head:  Normocephalic, atraumatic, mucous membranes moist  Eyes:  Conjunctiva/corneas clear, EOM's intact     Neck:  Supple,  no adenopathy;      Lungs: Clear to auscultation bilaterally, no wheezes rhonchi rales are noted  Chest wall: No tenderness  Heart::  irregular rate and rhythm, S1 and S2 normal, no murmur,   Abdomen: Soft, non-tender, nondistended bowel sounds active  Extremities: No cyanosis, clubbing, or edema  Pulses: 2+ and symmetric all extremities  Skin:  No rashes or lesions  Neuro/psych: A&O x3. CN II through XII are grossly intact with appropriate affect      Allergies:  Allergies   Allergen Reactions   • Cyclobenzaprine Palpitations   • Doxazosin Mesylate Other (See Comments)     itching   • Lisinopril Cough   • Tetracycline Nausea Only   • Valsartan Itching     Itching , constant urination , nausea    • Hydrochlorothiazide Other (See Comments)     Constant urination        Medication Review:     Current Outpatient Medications:   •  amiodarone (PACERONE) 200 MG tablet, Take 1 tablet by mouth Daily., Disp: 30 tablet, Rfl: 6  •  ELIQUIS 5 MG tablet tablet, Take 1 tablet by " mouth 2 (Two) Times a Day., Disp: 180 tablet, Rfl: 3  •  furosemide (LASIX) 40 MG tablet, Take 1 tablet by mouth 2 (Two) Times a Day. (Patient taking differently: Take 40 mg by mouth Daily.), Disp: 60 tablet, Rfl: 6  •  lansoprazole (PREVACID) 30 MG capsule, Take 30 mg by mouth Daily., Disp: , Rfl:   •  losartan (COZAAR) 25 MG tablet, Take 1 tablet by mouth Daily., Disp: 30 tablet, Rfl: 2  •  metoprolol succinate XL (TOPROL-XL) 25 MG 24 hr tablet, Take 1 tablet by mouth Daily., Disp: 30 tablet, Rfl: 6  •  potassium chloride (K-DUR) 10 MEQ CR tablet, Take 1 tablet by mouth 2 (Two) Times a Day. With each Lasix (furosemide) dose (Patient taking differently: Take 10 mEq by mouth Daily. With each Lasix (furosemide) dose), Disp: 60 tablet, Rfl: 6    Family History:  Family History   Problem Relation Age of Onset   • Thyroid disease Sister    • Cancer Brother        Past Medical History:  Past Medical History:   Diagnosis Date   • Acute hypokalemia    • Arthritis    • Atrial fibrillation (CMS/HCC)    • Fibromyalgia    • Hypertension    • Palpitations        Past surgical History:  Past Surgical History:   Procedure Laterality Date   • TUBAL ABDOMINAL LIGATION         Social History:  Social History     Socioeconomic History   • Marital status:      Spouse name: Not on file   • Number of children: Not on file   • Years of education: Not on file   • Highest education level: Not on file   Tobacco Use   • Smoking status: Never Smoker   • Smokeless tobacco: Never Used   Substance and Sexual Activity   • Alcohol use: No     Frequency: Never   • Drug use: No   • Sexual activity: Defer       Review of Systems:  The following systems were reviewed as they relate to the cardiovascular system: Constitutional, Eyes, ENT, Cardiovascular, Respiratory, Gastrointestinal, Integumentary, Neurological, Psychiatric, Hematologic, Endocrine, Musculoskeletal, and Genitourinary. The pertinent cardiovascular findings are reported above  with all other cardiovascular points within those systems being negative.    Diagnostic Study Review:     Current Electrocardiogram:    ECG 12 Lead  Date/Time: 6/29/2020 10:27 AM  Performed by: Tommie Coronado MD  Authorized by: Tommie Coronado MD   Comparison: compared with previous ECG   Similar to previous ECG  Rhythm: atrial fibrillation  Rate: normal  BPM: 96  Conduction: conduction normal  QRS axis: normal  Other findings: non-specific ST-T wave changes    Clinical impression: abnormal EKG              NOTE: The following portions of the patient's history were reviewed and updated this visit as appropriate: allergies, current medications, past family history, past medical history, past social history, past surgical history and problem list.

## 2020-09-10 RX ORDER — METOPROLOL SUCCINATE 25 MG/1
25 TABLET, EXTENDED RELEASE ORAL DAILY
Qty: 30 TABLET | Refills: 6 | Status: SHIPPED | OUTPATIENT
Start: 2020-09-10 | End: 2021-03-22 | Stop reason: SDUPTHER

## 2020-10-05 ENCOUNTER — OFFICE VISIT (OUTPATIENT)
Dept: CARDIOLOGY | Facility: CLINIC | Age: 75
End: 2020-10-05

## 2020-10-05 VITALS
DIASTOLIC BLOOD PRESSURE: 89 MMHG | WEIGHT: 193 LBS | BODY MASS INDEX: 32.15 KG/M2 | HEIGHT: 65 IN | SYSTOLIC BLOOD PRESSURE: 161 MMHG | RESPIRATION RATE: 18 BRPM | OXYGEN SATURATION: 96 % | HEART RATE: 93 BPM

## 2020-10-05 DIAGNOSIS — I27.20 PULMONARY HYPERTENSION (HCC): ICD-10-CM

## 2020-10-05 DIAGNOSIS — I10 ESSENTIAL HYPERTENSION: ICD-10-CM

## 2020-10-05 DIAGNOSIS — R00.2 PALPITATIONS: ICD-10-CM

## 2020-10-05 DIAGNOSIS — I48.91 ATRIAL FIBRILLATION WITH RVR (HCC): Primary | ICD-10-CM

## 2020-10-05 PROCEDURE — 93000 ELECTROCARDIOGRAM COMPLETE: CPT | Performed by: INTERNAL MEDICINE

## 2020-10-05 PROCEDURE — 99214 OFFICE O/P EST MOD 30 MIN: CPT | Performed by: INTERNAL MEDICINE

## 2020-10-05 RX ORDER — AMLODIPINE BESYLATE 5 MG/1
5 TABLET ORAL DAILY
Qty: 30 TABLET | Refills: 2 | Status: SHIPPED | OUTPATIENT
Start: 2020-10-05 | End: 2021-01-11

## 2020-10-05 NOTE — PROGRESS NOTES
Cardiology Office Visit      Encounter Date:  10/05/2020    Patient ID:   Facundo Maguire is a 75 y.o. female.    Reason For Followup:  Atrial fibrillation  Cardiomyopathy  Hypertension  Congestive heart failure  Coronary artery disease    Brief Clinical History:  Dear Dr. Johnston, April L, APRN    I had the pleasure of seeing Facundo Maguire today. As you are well aware, this is a 74 y.o. female with a past medical history that is significant for recent back-to-back hospitalization at [facility] for for symptoms of shortness of breath and congestive heart failure    Patient had a cardiac catheterization that showed mild to moderate obstructive coronary artery disease involving the LAD and right coronary artery  Patient also noted to be in cardiomyopathy with LV ejection fraction of 35 to 40% with pulmonary edema with the hospitalization for uncontrolled atrial fibrillation        Interval History:  Shortness of breath is better  Denies any chest pain no dizziness no syncope  Denies any dizziness or syncope  Compliant with medical therapy  Lower extremity edema is better  Complaining of significant itching with losartan  Assessment & Plan    Impressions:  Atrial fibrillation  Cardiomyopathy  Hypertension  Congestive heart failure  Coronary artery disease  JAVON with LV ejection fraction of 35 to 40% Anders echocardiogram with LV ejection fraction of 50%  Systolic heart failure NYHA class III  Cardiac catheterization showing a mild to moderate obstructive coronary artery disease involving the LAD and right coronary artery  Failed cardioversion currently in atrial fibrillation with controlled ventricular response  Still in atrial fibrillation with controlled ventricular response  Impaired fasting glucose    Recommendations:  Discontinue losartan and see if that will help with the itching  Start patient on Norvasc for blood pressure control  If there is no improvement in the itching may be resume  "losartan  Decrease the dose of potassium to 10 once a day  Decrease the dose of Lasix to 40 mg p.o. once a day  Continue aggressive risk factor modification  Repeat echocardiogram with LV ejection fraction of 50%  Need for aggressive risk factor modification regular exercise and weight loss discussed with the patient  Continue close monitoring  Continue current dose of beta-blocker   May consider ablation if patient continues to be symptomatic from atrial fibrillation  Further recommendations based on patient course  Home blood pressure monitoring    Follow-up in office in 3 months    Objective:    Vitals:  Vitals:    10/05/20 1005   BP: 161/89   BP Location: Right arm   Pulse: 93   Resp: 18   SpO2: 96%   Weight: 87.5 kg (193 lb)   Height: 165.1 cm (65\")       Physical Exam:    General: Alert, cooperative, no distress, appears stated age  Head:  Normocephalic, atraumatic, mucous membranes moist  Eyes:  Conjunctiva/corneas clear, EOM's intact     Neck:  Supple,  no adenopathy;      Lungs: Clear to auscultation bilaterally, no wheezes rhonchi rales are noted  Chest wall: No tenderness  Heart::  Irregular rate and rhythm, S1 and S2 normal, no murmur, rub or gallop  Abdomen: Soft, non-tender, nondistended bowel sounds active  Extremities: No cyanosis, clubbing, or edema  Pulses: 2+ and symmetric all extremities  Skin:  No rashes or lesions  Neuro/psych: A&O x3. CN II through XII are grossly intact with appropriate affect      Allergies:  Allergies   Allergen Reactions   • Cyclobenzaprine Palpitations   • Doxazosin Mesylate Other (See Comments)     itching   • Lisinopril Cough   • Tetracycline Nausea Only   • Valsartan Itching     Itching , constant urination , nausea    • Hydrochlorothiazide Other (See Comments)     Constant urination        Medication Review:     Current Outpatient Medications:   •  ELIQUIS 5 MG tablet tablet, Take 1 tablet by mouth 2 (Two) Times a Day., Disp: 180 tablet, Rfl: 3  •  furosemide (LASIX) " 40 MG tablet, Take 1 tablet by mouth 2 (Two) Times a Day. (Patient taking differently: Take 40 mg by mouth Daily.), Disp: 60 tablet, Rfl: 6  •  lansoprazole (PREVACID) 30 MG capsule, Take 30 mg by mouth Daily., Disp: , Rfl:   •  losartan (COZAAR) 25 MG tablet, Take 1 tablet by mouth Daily., Disp: 30 tablet, Rfl: 2  •  metoprolol succinate XL (TOPROL-XL) 25 MG 24 hr tablet, Take 1 tablet by mouth Daily., Disp: 30 tablet, Rfl: 6  •  potassium chloride (K-DUR) 10 MEQ CR tablet, Take 1 tablet by mouth 2 (Two) Times a Day. With each Lasix (furosemide) dose (Patient taking differently: Take 10 mEq by mouth Daily. With each Lasix (furosemide) dose), Disp: 60 tablet, Rfl: 6    Family History:  Family History   Problem Relation Age of Onset   • Thyroid disease Sister    • Cancer Brother        Past Medical History:  Past Medical History:   Diagnosis Date   • Acute hypokalemia    • Arthritis    • Atrial fibrillation (CMS/HCC)    • Fibromyalgia    • Hypertension    • Palpitations        Past surgical History:  Past Surgical History:   Procedure Laterality Date   • TUBAL ABDOMINAL LIGATION         Social History:  Social History     Socioeconomic History   • Marital status:      Spouse name: Not on file   • Number of children: Not on file   • Years of education: Not on file   • Highest education level: Not on file   Tobacco Use   • Smoking status: Never Smoker   • Smokeless tobacco: Never Used   Substance and Sexual Activity   • Alcohol use: No     Frequency: Never   • Drug use: No   • Sexual activity: Defer       Review of Systems:  The following systems were reviewed as they relate to the cardiovascular system: Constitutional, Eyes, ENT, Cardiovascular, Respiratory, Gastrointestinal, Integumentary, Neurological, Psychiatric, Hematologic, Endocrine, Musculoskeletal, and Genitourinary. The pertinent cardiovascular findings are reported above with all other cardiovascular points within those systems being  negative.    Diagnostic Study Review:     Current Electrocardiogram:    ECG 12 Lead    Date/Time: 10/5/2020 10:27 AM  Performed by: Tommie Coronado MD  Authorized by: Tommie Coronado MD   Comparison: compared with previous ECG   Similar to previous ECG  Rhythm: atrial fibrillation  Rate: normal  BPM: 92  Conduction: conduction normal  QRS axis: normal  Other findings: non-specific ST-T wave changes    Clinical impression: abnormal EKG              NOTE: The following portions of the patient's history were reviewed and updated this visit as appropriate: allergies, current medications, past family history, past medical history, past social history, past surgical history and problem list.

## 2020-11-05 NOTE — TELEPHONE ENCOUNTER
Pt requests to start back on Lisinopril due to side effects of Amlodipine. Pt did have a cough from Lisinopril, but would like to try it again. Per Dr. Saud deshpande to try Lisinopril 10mg- 1 po daily in place of Amlodipine. I informed the Pt and she stated understanding.

## 2020-11-09 RX ORDER — LISINOPRIL 10 MG/1
10 TABLET ORAL DAILY
Qty: 30 TABLET | Refills: 6 | Status: SHIPPED | OUTPATIENT
Start: 2020-11-09 | End: 2021-11-15 | Stop reason: SDUPTHER

## 2021-01-11 ENCOUNTER — OFFICE VISIT (OUTPATIENT)
Dept: CARDIOLOGY | Facility: CLINIC | Age: 76
End: 2021-01-11

## 2021-01-11 VITALS
RESPIRATION RATE: 18 BRPM | DIASTOLIC BLOOD PRESSURE: 88 MMHG | HEART RATE: 99 BPM | BODY MASS INDEX: 31.99 KG/M2 | HEIGHT: 65 IN | SYSTOLIC BLOOD PRESSURE: 146 MMHG | WEIGHT: 192 LBS | OXYGEN SATURATION: 97 %

## 2021-01-11 DIAGNOSIS — I50.22 CHRONIC SYSTOLIC CONGESTIVE HEART FAILURE (HCC): ICD-10-CM

## 2021-01-11 DIAGNOSIS — E78.5 HYPERLIPIDEMIA LDL GOAL <70: ICD-10-CM

## 2021-01-11 DIAGNOSIS — I25.10 CORONARY ARTERY DISEASE INVOLVING NATIVE CORONARY ARTERY OF NATIVE HEART WITHOUT ANGINA PECTORIS: Primary | ICD-10-CM

## 2021-01-11 DIAGNOSIS — I10 ESSENTIAL HYPERTENSION: ICD-10-CM

## 2021-01-11 DIAGNOSIS — I48.91 ATRIAL FIBRILLATION WITH RVR (HCC): ICD-10-CM

## 2021-01-11 DIAGNOSIS — I27.20 PULMONARY HYPERTENSION (HCC): ICD-10-CM

## 2021-01-11 PROCEDURE — 93000 ELECTROCARDIOGRAM COMPLETE: CPT | Performed by: INTERNAL MEDICINE

## 2021-01-11 PROCEDURE — 99214 OFFICE O/P EST MOD 30 MIN: CPT | Performed by: INTERNAL MEDICINE

## 2021-01-11 NOTE — PROGRESS NOTES
Cardiology Office Visit      Encounter Date:  01/11/2021    Patient ID:   Facundo Maguire is a 75 y.o. female.    Reason For Followup:  Atrial fibrillation  Cardiomyopathy  Hypertension  Congestive heart failure  Coronary artery disease    Brief Clinical History:  Dear Dr. Johnston, April L, APRN    I had the pleasure of seeing Facundo Maguire today. As you are well aware, this is a 75 y.o. female with a past medical history that is significant for recent back-to-back hospitalization at [facility] for for symptoms of shortness of breath and congestive heart failure    Patient had a cardiac catheterization that showed mild to moderate obstructive coronary artery disease involving the LAD and right coronary artery  Patient also noted to be in cardiomyopathy with LV ejection fraction of 35 to 40% with pulmonary edema with the hospitalization for uncontrolled atrial fibrillation        Interval History:  Shortness of breath is better  Denies any chest pain no dizziness no syncope  Denies any dizziness or syncope  Compliant with medical therapy  Lower extremity edema is better  Complaining of some minimal cough with the lisinopril but tolerating the medication  Assessment & Plan    Impressions:  Atrial fibrillation  Cardiomyopathy  Hypertension  Congestive heart failure  Coronary artery disease  JAVON with LV ejection fraction of 35 to 40% Anders echocardiogram with LV ejection fraction of 50%  Systolic heart failure NYHA class III  Cardiac catheterization showing a mild to moderate obstructive coronary artery disease involving the LAD and right coronary artery  Failed cardioversion currently in atrial fibrillation with controlled ventricular response  Still in atrial fibrillation with controlled ventricular response  Impaired fasting glucose    Recommendations:  Continue lisinopril l  potassium to 10 once a day  Lasix to 40 mg p.o. once a day  Continue aggressive risk factor modification  Repeat  "echocardiogram with LV ejection fraction of 50%  Need for aggressive risk factor modification regular exercise and weight loss discussed with the patient  Continue close monitoring  Continue current dose of beta-blocker   May consider ablation if patient continues to be symptomatic from atrial fibrillation  Further recommendations based on patient course  Tolerating anticoagulation therapy  Complaining of some nonspecific symptoms of headache labs and some allergic rhinitis and  Home blood pressure monitoring  Recheck labs including CBC CMP lipids and thyroid profile  Follow-up in office in 6 months    Objective:    Vitals:  Vitals:    01/11/21 1039   BP: 146/88   BP Location: Left arm   Pulse: 99   Resp: 18   SpO2: 97%   Weight: 87.1 kg (192 lb)   Height: 165.1 cm (65\")       Physical Exam:    General: Alert, cooperative, no distress, appears stated age  Head:  Normocephalic, atraumatic, mucous membranes moist  Eyes:  Conjunctiva/corneas clear, EOM's intact     Neck:  Supple,  no adenopathy;      Lungs: Clear to auscultation bilaterally, no wheezes rhonchi rales are noted  Chest wall: No tenderness  Heart::  Irregular rate and rhythm, S1 and S2 normal, no murmur, rub or gallop  Abdomen: Soft, non-tender, nondistended bowel sounds active  Extremities: No cyanosis, clubbing, or edema  Pulses: 2+ and symmetric all extremities  Skin:  No rashes or lesions  Neuro/psych: A&O x3. CN II through XII are grossly intact with appropriate affect      Allergies:  Allergies   Allergen Reactions   • Cyclobenzaprine Palpitations   • Doxazosin Mesylate Other (See Comments)     itching   • Lisinopril Cough   • Tetracycline Nausea Only   • Valsartan Itching     Itching , constant urination , nausea    • Hydrochlorothiazide Other (See Comments)     Constant urination        Medication Review:     Current Outpatient Medications:   •  ELIQUIS 5 MG tablet tablet, Take 1 tablet by mouth 2 (Two) Times a Day., Disp: 180 tablet, Rfl: 3  •  " furosemide (LASIX) 40 MG tablet, Take 1 tablet by mouth 2 (Two) Times a Day. (Patient taking differently: Take 40 mg by mouth Daily.), Disp: 60 tablet, Rfl: 6  •  lansoprazole (PREVACID) 30 MG capsule, Take 30 mg by mouth Daily., Disp: , Rfl:   •  lisinopril (PRINIVIL,ZESTRIL) 10 MG tablet, Take 1 tablet by mouth Daily., Disp: 30 tablet, Rfl: 6  •  metoprolol succinate XL (TOPROL-XL) 25 MG 24 hr tablet, Take 1 tablet by mouth Daily., Disp: 30 tablet, Rfl: 6  •  potassium chloride (K-DUR) 10 MEQ CR tablet, Take 1 tablet by mouth 2 (Two) Times a Day. With each Lasix (furosemide) dose (Patient taking differently: Take 10 mEq by mouth Daily. With each Lasix (furosemide) dose), Disp: 60 tablet, Rfl: 6    Family History:  Family History   Problem Relation Age of Onset   • Thyroid disease Sister    • Cancer Brother        Past Medical History:  Past Medical History:   Diagnosis Date   • Acute hypokalemia    • Arthritis    • Atrial fibrillation (CMS/HCC)    • Fibromyalgia    • Hypertension    • Palpitations        Past surgical History:  Past Surgical History:   Procedure Laterality Date   • TUBAL ABDOMINAL LIGATION         Social History:  Social History     Socioeconomic History   • Marital status:      Spouse name: Not on file   • Number of children: Not on file   • Years of education: Not on file   • Highest education level: Not on file   Tobacco Use   • Smoking status: Never Smoker   • Smokeless tobacco: Never Used   Substance and Sexual Activity   • Alcohol use: No     Frequency: Never   • Drug use: No   • Sexual activity: Defer       Review of Systems:  The following systems were reviewed as they relate to the cardiovascular system: Constitutional, Eyes, ENT, Cardiovascular, Respiratory, Gastrointestinal, Integumentary, Neurological, Psychiatric, Hematologic, Endocrine, Musculoskeletal, and Genitourinary. The pertinent cardiovascular findings are reported above with all other cardiovascular points within  those systems being negative.    Diagnostic Study Review:     Current Electrocardiogram:    ECG 12 Lead    Date/Time: 1/11/2021 10:58 AM  Performed by: Tommie Coronado MD  Authorized by: Tommie Coronado MD   Comparison: compared with previous ECG   Similar to previous ECG  Rhythm: atrial fibrillation  Rate: normal  BPM: 99  Conduction: conduction normal  QRS axis: normal  Other findings: non-specific ST-T wave changes              NOTE: The following portions of the patient's history were reviewed and updated this visit as appropriate: allergies, current medications, past family history, past medical history, past social history, past surgical history and problem list.

## 2021-01-21 ENCOUNTER — LAB (OUTPATIENT)
Dept: FAMILY MEDICINE CLINIC | Facility: CLINIC | Age: 76
End: 2021-01-21

## 2021-01-21 DIAGNOSIS — E78.5 HYPERLIPIDEMIA LDL GOAL <70: ICD-10-CM

## 2021-01-21 DIAGNOSIS — I25.10 CORONARY ARTERY DISEASE INVOLVING NATIVE CORONARY ARTERY OF NATIVE HEART WITHOUT ANGINA PECTORIS: ICD-10-CM

## 2021-01-21 DIAGNOSIS — I48.91 ATRIAL FIBRILLATION WITH RVR (HCC): ICD-10-CM

## 2021-01-21 DIAGNOSIS — I10 ESSENTIAL HYPERTENSION: ICD-10-CM

## 2021-01-21 LAB
ALBUMIN SERPL-MCNC: 4.4 G/DL (ref 3.5–5.2)
ALBUMIN/GLOB SERPL: 1.5 G/DL
ALP SERPL-CCNC: 81 U/L (ref 39–117)
ALT SERPL W P-5'-P-CCNC: 14 U/L (ref 1–33)
ANION GAP SERPL CALCULATED.3IONS-SCNC: 10.4 MMOL/L (ref 5–15)
AST SERPL-CCNC: 20 U/L (ref 1–32)
BASOPHILS # BLD AUTO: 0.08 10*3/MM3 (ref 0–0.2)
BASOPHILS NFR BLD AUTO: 0.9 % (ref 0–1.5)
BILIRUB SERPL-MCNC: 0.6 MG/DL (ref 0–1.2)
BUN SERPL-MCNC: 9 MG/DL (ref 8–23)
BUN/CREAT SERPL: 11 (ref 7–25)
CALCIUM SPEC-SCNC: 9.5 MG/DL (ref 8.6–10.5)
CHLORIDE SERPL-SCNC: 102 MMOL/L (ref 98–107)
CHOLEST SERPL-MCNC: 179 MG/DL (ref 0–200)
CO2 SERPL-SCNC: 27.6 MMOL/L (ref 22–29)
CREAT SERPL-MCNC: 0.82 MG/DL (ref 0.57–1)
DEPRECATED RDW RBC AUTO: 40.5 FL (ref 37–54)
EOSINOPHIL # BLD AUTO: 0.17 10*3/MM3 (ref 0–0.4)
EOSINOPHIL NFR BLD AUTO: 1.9 % (ref 0.3–6.2)
ERYTHROCYTE [DISTWIDTH] IN BLOOD BY AUTOMATED COUNT: 12.2 % (ref 12.3–15.4)
GFR SERPL CREATININE-BSD FRML MDRD: 68 ML/MIN/1.73
GLOBULIN UR ELPH-MCNC: 2.9 GM/DL
GLUCOSE SERPL-MCNC: 93 MG/DL (ref 65–99)
HCT VFR BLD AUTO: 41.1 % (ref 34–46.6)
HDLC SERPL-MCNC: 54 MG/DL (ref 40–60)
HGB BLD-MCNC: 14.3 G/DL (ref 12–15.9)
IMM GRANULOCYTES # BLD AUTO: 0.05 10*3/MM3 (ref 0–0.05)
IMM GRANULOCYTES NFR BLD AUTO: 0.6 % (ref 0–0.5)
LDLC SERPL CALC-MCNC: 106 MG/DL (ref 0–100)
LDLC/HDLC SERPL: 1.93 {RATIO}
LYMPHOCYTES # BLD AUTO: 1.38 10*3/MM3 (ref 0.7–3.1)
LYMPHOCYTES NFR BLD AUTO: 15.5 % (ref 19.6–45.3)
MCH RBC QN AUTO: 32.1 PG (ref 26.6–33)
MCHC RBC AUTO-ENTMCNC: 34.8 G/DL (ref 31.5–35.7)
MCV RBC AUTO: 92.2 FL (ref 79–97)
MONOCYTES # BLD AUTO: 0.67 10*3/MM3 (ref 0.1–0.9)
MONOCYTES NFR BLD AUTO: 7.5 % (ref 5–12)
NEUTROPHILS NFR BLD AUTO: 6.57 10*3/MM3 (ref 1.7–7)
NEUTROPHILS NFR BLD AUTO: 73.6 % (ref 42.7–76)
NRBC BLD AUTO-RTO: 0 /100 WBC (ref 0–0.2)
PLATELET # BLD AUTO: 208 10*3/MM3 (ref 140–450)
PMV BLD AUTO: 11.2 FL (ref 6–12)
POTASSIUM SERPL-SCNC: 4.2 MMOL/L (ref 3.5–5.2)
PROT SERPL-MCNC: 7.3 G/DL (ref 6–8.5)
RBC # BLD AUTO: 4.46 10*6/MM3 (ref 3.77–5.28)
SODIUM SERPL-SCNC: 140 MMOL/L (ref 136–145)
TRIGL SERPL-MCNC: 103 MG/DL (ref 0–150)
TSH SERPL DL<=0.05 MIU/L-ACNC: 3.56 UIU/ML (ref 0.27–4.2)
VLDLC SERPL-MCNC: 19 MG/DL (ref 5–40)
WBC # BLD AUTO: 8.92 10*3/MM3 (ref 3.4–10.8)

## 2021-01-21 PROCEDURE — 80053 COMPREHEN METABOLIC PANEL: CPT | Performed by: INTERNAL MEDICINE

## 2021-01-21 PROCEDURE — 85025 COMPLETE CBC W/AUTO DIFF WBC: CPT | Performed by: INTERNAL MEDICINE

## 2021-01-21 PROCEDURE — 84443 ASSAY THYROID STIM HORMONE: CPT | Performed by: INTERNAL MEDICINE

## 2021-01-21 PROCEDURE — 80061 LIPID PANEL: CPT | Performed by: INTERNAL MEDICINE

## 2021-01-21 PROCEDURE — 36415 COLL VENOUS BLD VENIPUNCTURE: CPT | Performed by: INTERNAL MEDICINE

## 2021-01-22 ENCOUNTER — TELEPHONE (OUTPATIENT)
Dept: CARDIOLOGY | Facility: CLINIC | Age: 76
End: 2021-01-22

## 2021-01-22 NOTE — TELEPHONE ENCOUNTER
Called and informed the Pt per Dr. Villavicencio that her labs look normal. Pt stated understanding.

## 2021-03-08 RX ORDER — FUROSEMIDE 40 MG/1
40 TABLET ORAL DAILY
Qty: 90 TABLET | Refills: 2 | Status: SHIPPED | OUTPATIENT
Start: 2021-03-08 | End: 2021-11-22 | Stop reason: SDUPTHER

## 2021-03-22 RX ORDER — APIXABAN 5 MG/1
5 TABLET, FILM COATED ORAL 2 TIMES DAILY
Qty: 180 TABLET | Refills: 3 | Status: SHIPPED | OUTPATIENT
Start: 2021-03-22 | End: 2022-04-11

## 2021-03-22 RX ORDER — METOPROLOL SUCCINATE 25 MG/1
25 TABLET, EXTENDED RELEASE ORAL DAILY
Qty: 30 TABLET | Refills: 6 | Status: SHIPPED | OUTPATIENT
Start: 2021-03-22 | End: 2021-07-12

## 2021-04-20 RX ORDER — POTASSIUM CHLORIDE 750 MG/1
TABLET, FILM COATED, EXTENDED RELEASE ORAL
Qty: 30 TABLET | Refills: 6 | Status: SHIPPED | OUTPATIENT
Start: 2021-04-20 | End: 2023-01-04

## 2021-07-12 ENCOUNTER — OFFICE VISIT (OUTPATIENT)
Dept: CARDIOLOGY | Facility: CLINIC | Age: 76
End: 2021-07-12

## 2021-07-12 VITALS
WEIGHT: 188 LBS | HEART RATE: 119 BPM | OXYGEN SATURATION: 94 % | BODY MASS INDEX: 31.28 KG/M2 | SYSTOLIC BLOOD PRESSURE: 150 MMHG | DIASTOLIC BLOOD PRESSURE: 74 MMHG

## 2021-07-12 DIAGNOSIS — I48.91 ATRIAL FIBRILLATION WITH RVR (HCC): Primary | ICD-10-CM

## 2021-07-12 DIAGNOSIS — E78.5 HYPERLIPIDEMIA LDL GOAL <70: ICD-10-CM

## 2021-07-12 DIAGNOSIS — I25.10 CORONARY ARTERY DISEASE INVOLVING NATIVE CORONARY ARTERY OF NATIVE HEART WITHOUT ANGINA PECTORIS: ICD-10-CM

## 2021-07-12 DIAGNOSIS — I10 ESSENTIAL HYPERTENSION: ICD-10-CM

## 2021-07-12 PROCEDURE — 93000 ELECTROCARDIOGRAM COMPLETE: CPT | Performed by: INTERNAL MEDICINE

## 2021-07-12 PROCEDURE — 99214 OFFICE O/P EST MOD 30 MIN: CPT | Performed by: INTERNAL MEDICINE

## 2021-07-12 RX ORDER — METOPROLOL SUCCINATE 50 MG/1
50 TABLET, EXTENDED RELEASE ORAL DAILY
Qty: 90 TABLET | Refills: 2 | Status: SHIPPED | OUTPATIENT
Start: 2021-07-12 | End: 2021-09-17 | Stop reason: SDUPTHER

## 2021-07-12 NOTE — PROGRESS NOTES
Cardiology Office Visit      Encounter Date:  07/12/2021    Patient ID:   Facundo Maguire is a 75 y.o. female.    Reason For Followup:  Atrial fibrillation  Cardiomyopathy  Hypertension  Congestive heart failure  Coronary artery disease    Brief Clinical History:  Dear Dr. Johnston, April L, APRN    I had the pleasure of seeing Facundo Maguire today. As you are well aware, this is a 75 y.o. female with a past medical history that is significant for recent back-to-back hospitalization at [facility] for for symptoms of shortness of breath and congestive heart failure    Patient had a cardiac catheterization that showed mild to moderate obstructive coronary artery disease involving the LAD and right coronary artery  Patient also noted to be in cardiomyopathy with LV ejection fraction of 35 to 40% with pulmonary edema with the hospitalization for uncontrolled atrial fibrillation        Interval History:  Shortness of breath is better  Denies any chest pain no dizziness no syncope  Denies any dizziness or syncope  Compliant with medical therapy  Lower extremity edema is better    Assessment & Plan    Impressions:  Atrial fibrillation with rapid atrial response  Cardiomyopathy  Hypertension  Congestive heart failure  Coronary artery disease  JAVON with LV ejection fraction of 35 to 40% Anders echocardiogram with LV ejection fraction of 50%  Systolic heart failure NYHA class III  Cardiac catheterization showing a mild to moderate obstructive coronary artery disease involving the LAD and right coronary artery  Failed cardioversion currently in atrial fibrillation with controlled ventricular response  Still in atrial fibrillation with controlled ventricular response  Impaired fasting glucose    Recommendations:  Continue lisinopril l  potassium to 10 once a day  Lasix to 40 mg p.o. once a day  Crease Toprol-XL from 25 mg to 50 mg p.o. once a day  Continue aggressive risk factor modification  Repeat  echocardiogram with LV ejection fraction of 50%  Need for aggressive risk factor modification regular exercise and weight loss discussed with the patient  Continue close monitoring  May consider ablation if patient continues to be symptomatic from atrial fibrillation  Further recommendations based on patient course  Tolerating anticoagulation therapy  Complaining of some nonspecific symptoms of headache labs and some allergic rhinitis and  Home blood pressure monitoring  Recheck labs including CBC CMP lipids and thyroid profile  Follow-up in office in 6 months      Objective:    Vitals:  Vitals:    07/12/21 1005   BP: 150/74   Pulse: 119   SpO2: 94%   Weight: 85.3 kg (188 lb)       Physical Exam:    General: Alert, cooperative, no distress, appears stated age  Head:  Normocephalic, atraumatic, mucous membranes moist  Eyes:  Conjunctiva/corneas clear, EOM's intact     Neck:  Supple,  no adenopathy;      Lungs: Clear to auscultation bilaterally, no wheezes rhonchi rales are noted  Chest wall: No tenderness  Heart::  Irregular rate and rhythm, S1 and S2 normal, tachycardia  Abdomen: Soft, non-tender, nondistended bowel sounds active  Extremities: No cyanosis, clubbing, or edema  Pulses: 2+ and symmetric all extremities  Skin:  No rashes or lesions  Neuro/psych: A&O x3. CN II through XII are grossly intact with appropriate affect      Allergies:  Allergies   Allergen Reactions   • Cyclobenzaprine Palpitations   • Doxazosin Mesylate Other (See Comments)     itching   • Lisinopril Cough   • Tetracycline Nausea Only   • Valsartan Itching     Itching , constant urination , nausea    • Hydrochlorothiazide Other (See Comments)     Constant urination        Medication Review:     Current Outpatient Medications:   •  Eliquis 5 MG tablet tablet, Take 1 tablet by mouth 2 (Two) Times a Day., Disp: 180 tablet, Rfl: 3  •  furosemide (LASIX) 40 MG tablet, Take 1 tablet by mouth Daily., Disp: 90 tablet, Rfl: 2  •  lansoprazole  (PREVACID) 30 MG capsule, Take 30 mg by mouth Daily., Disp: , Rfl:   •  lisinopril (PRINIVIL,ZESTRIL) 10 MG tablet, Take 1 tablet by mouth Daily., Disp: 30 tablet, Rfl: 6  •  metoprolol succinate XL (TOPROL-XL) 25 MG 24 hr tablet, Take 1 tablet by mouth Daily., Disp: 30 tablet, Rfl: 6  •  potassium chloride 10 MEQ CR tablet, TAKE 1 TABLET BY MOUTH WITH EACH DOSE OF LASIX (FUROSEMIDE), Disp: 30 tablet, Rfl: 6    Family History:  Family History   Problem Relation Age of Onset   • Thyroid disease Sister    • Cancer Brother        Past Medical History:  Past Medical History:   Diagnosis Date   • Acute hypokalemia    • Arthritis    • Atrial fibrillation (CMS/HCC)    • Fibromyalgia    • Hypertension    • Palpitations        Past surgical History:  Past Surgical History:   Procedure Laterality Date   • TUBAL ABDOMINAL LIGATION         Social History:  Social History     Socioeconomic History   • Marital status:      Spouse name: Not on file   • Number of children: Not on file   • Years of education: Not on file   • Highest education level: Not on file   Tobacco Use   • Smoking status: Never Smoker   • Smokeless tobacco: Never Used   Substance and Sexual Activity   • Alcohol use: No   • Drug use: No   • Sexual activity: Defer       Review of Systems:  The following systems were reviewed as they relate to the cardiovascular system: Constitutional, Eyes, ENT, Cardiovascular, Respiratory, Gastrointestinal, Integumentary, Neurological, Psychiatric, Hematologic, Endocrine, Musculoskeletal, and Genitourinary. The pertinent cardiovascular findings are reported above with all other cardiovascular points within those systems being negative.    Diagnostic Study Review:     Current Electrocardiogram:    ECG 12 Lead    Date/Time: 7/12/2021 10:22 AM  Performed by: Tommie Coronado MD  Authorized by: Tommie Coronado MD   Comparison: compared with previous ECG   Similar to previous ECG  Rhythm: atrial  fibrillation  Ectopy: unifocal PVCs  Rate: tachycardic  BPM: 119  Conduction: conduction normal  QRS axis: normal  Other findings: non-specific ST-T wave changes    Clinical impression: abnormal EKG              NOTE: The following portions of the patient's history were reviewed and updated this visit as appropriate: allergies, current medications, past family history, past medical history, past social history, past surgical history and problem list.

## 2021-07-22 ENCOUNTER — LAB (OUTPATIENT)
Dept: FAMILY MEDICINE CLINIC | Facility: CLINIC | Age: 76
End: 2021-07-22

## 2021-07-22 DIAGNOSIS — I48.91 ATRIAL FIBRILLATION WITH RVR (HCC): ICD-10-CM

## 2021-07-22 DIAGNOSIS — I25.10 CORONARY ARTERY DISEASE INVOLVING NATIVE CORONARY ARTERY OF NATIVE HEART WITHOUT ANGINA PECTORIS: ICD-10-CM

## 2021-07-22 DIAGNOSIS — E78.5 HYPERLIPIDEMIA LDL GOAL <70: ICD-10-CM

## 2021-07-22 DIAGNOSIS — I10 ESSENTIAL HYPERTENSION: ICD-10-CM

## 2021-07-22 PROCEDURE — 36415 COLL VENOUS BLD VENIPUNCTURE: CPT | Performed by: INTERNAL MEDICINE

## 2021-07-22 PROCEDURE — 84443 ASSAY THYROID STIM HORMONE: CPT | Performed by: INTERNAL MEDICINE

## 2021-07-22 PROCEDURE — 85025 COMPLETE CBC W/AUTO DIFF WBC: CPT | Performed by: INTERNAL MEDICINE

## 2021-07-22 PROCEDURE — 80053 COMPREHEN METABOLIC PANEL: CPT | Performed by: INTERNAL MEDICINE

## 2021-07-22 PROCEDURE — 80061 LIPID PANEL: CPT | Performed by: INTERNAL MEDICINE

## 2021-07-23 ENCOUNTER — TELEPHONE (OUTPATIENT)
Dept: CARDIOLOGY | Facility: CLINIC | Age: 76
End: 2021-07-23

## 2021-07-23 LAB
ALBUMIN SERPL-MCNC: 4.3 G/DL (ref 3.5–5.2)
ALBUMIN/GLOB SERPL: 1.6 G/DL
ALP SERPL-CCNC: 84 U/L (ref 39–117)
ALT SERPL W P-5'-P-CCNC: 16 U/L (ref 1–33)
ANION GAP SERPL CALCULATED.3IONS-SCNC: 10.4 MMOL/L (ref 5–15)
AST SERPL-CCNC: 21 U/L (ref 1–32)
BASOPHILS # BLD AUTO: 0.08 10*3/MM3 (ref 0–0.2)
BASOPHILS NFR BLD AUTO: 1.1 % (ref 0–1.5)
BILIRUB SERPL-MCNC: 0.6 MG/DL (ref 0–1.2)
BUN SERPL-MCNC: 13 MG/DL (ref 8–23)
BUN/CREAT SERPL: 14.4 (ref 7–25)
CALCIUM SPEC-SCNC: 9.2 MG/DL (ref 8.6–10.5)
CHLORIDE SERPL-SCNC: 104 MMOL/L (ref 98–107)
CHOLEST SERPL-MCNC: 162 MG/DL (ref 0–200)
CO2 SERPL-SCNC: 25.6 MMOL/L (ref 22–29)
CREAT SERPL-MCNC: 0.9 MG/DL (ref 0.57–1)
DEPRECATED RDW RBC AUTO: 42.1 FL (ref 37–54)
EOSINOPHIL # BLD AUTO: 0.17 10*3/MM3 (ref 0–0.4)
EOSINOPHIL NFR BLD AUTO: 2.3 % (ref 0.3–6.2)
ERYTHROCYTE [DISTWIDTH] IN BLOOD BY AUTOMATED COUNT: 12.1 % (ref 12.3–15.4)
GFR SERPL CREATININE-BSD FRML MDRD: 61 ML/MIN/1.73
GLOBULIN UR ELPH-MCNC: 2.7 GM/DL
GLUCOSE SERPL-MCNC: 96 MG/DL (ref 65–99)
HCT VFR BLD AUTO: 41.4 % (ref 34–46.6)
HDLC SERPL-MCNC: 55 MG/DL (ref 40–60)
HGB BLD-MCNC: 13.8 G/DL (ref 12–15.9)
IMM GRANULOCYTES # BLD AUTO: 0.05 10*3/MM3 (ref 0–0.05)
IMM GRANULOCYTES NFR BLD AUTO: 0.7 % (ref 0–0.5)
LDLC SERPL CALC-MCNC: 91 MG/DL (ref 0–100)
LDLC/HDLC SERPL: 1.64 {RATIO}
LYMPHOCYTES # BLD AUTO: 1.41 10*3/MM3 (ref 0.7–3.1)
LYMPHOCYTES NFR BLD AUTO: 19.2 % (ref 19.6–45.3)
MCH RBC QN AUTO: 31.4 PG (ref 26.6–33)
MCHC RBC AUTO-ENTMCNC: 33.3 G/DL (ref 31.5–35.7)
MCV RBC AUTO: 94.1 FL (ref 79–97)
MONOCYTES # BLD AUTO: 0.5 10*3/MM3 (ref 0.1–0.9)
MONOCYTES NFR BLD AUTO: 6.8 % (ref 5–12)
NEUTROPHILS NFR BLD AUTO: 5.12 10*3/MM3 (ref 1.7–7)
NEUTROPHILS NFR BLD AUTO: 69.9 % (ref 42.7–76)
NRBC BLD AUTO-RTO: 0 /100 WBC (ref 0–0.2)
PLATELET # BLD AUTO: 207 10*3/MM3 (ref 140–450)
PMV BLD AUTO: 11.1 FL (ref 6–12)
POTASSIUM SERPL-SCNC: 4.2 MMOL/L (ref 3.5–5.2)
PROT SERPL-MCNC: 7 G/DL (ref 6–8.5)
RBC # BLD AUTO: 4.4 10*6/MM3 (ref 3.77–5.28)
SODIUM SERPL-SCNC: 140 MMOL/L (ref 136–145)
TRIGL SERPL-MCNC: 83 MG/DL (ref 0–150)
TSH SERPL DL<=0.05 MIU/L-ACNC: 3.08 UIU/ML (ref 0.27–4.2)
VLDLC SERPL-MCNC: 16 MG/DL (ref 5–40)
WBC # BLD AUTO: 7.33 10*3/MM3 (ref 3.4–10.8)

## 2021-07-23 NOTE — TELEPHONE ENCOUNTER
Notified pt.         ----- Message from Tommie Coronado MD sent at 7/23/2021  6:51 AM EDT -----  Labs look good

## 2021-08-30 ENCOUNTER — TELEPHONE (OUTPATIENT)
Dept: CARDIOLOGY | Facility: CLINIC | Age: 76
End: 2021-08-30

## 2021-08-30 NOTE — TELEPHONE ENCOUNTER
Notified pt. She agreed to try this.       ----- Message from Tommie Coronado MD sent at 8/30/2021  2:12 PM EDT -----  Regarding: FW: Non-Urgent Medical Question  Contact: [phone]      If she thinks it is from medications okay to discontinue potassium supplements for now  Decrease the dose of Lasix to 20 mg p.o. once a day  Recheck a BMP in 1 to 2 weeks to see if she needs any potassium supplements  ----- Message -----  From: Rosa Maria Andersen MA  Sent: 8/30/2021   2:10 PM EDT  To: Tommei Coronado MD  Subject: FW: Non-Urgent Medical Question                  Forwarding a portal message.  ----- Message -----  From: Facundo Maguire  Sent: 8/30/2021  12:47 PM EDT  To: [facility] Clinical Pool  Subject: Non-Urgent Medical Question                      This past week I have had stomach/abdominal pain, nausa, bloating/gas about three hours after I take my medication. I did not relate it to my medication until saturday I took medications as usual, except for the forosemide and potassium. I took it about noon, and two to three hours my stomach started acting up. this made me question if this could be my problem. I did not take furosemide and potassium on Sunday, and had no stomach issues. I have not take again today, and I have never gone without taking longer than one day, and that was only when no bathroom available, (traveling).My question is this a possibility that I have developed issues with this medication, and should I continue to take it?

## 2021-09-17 ENCOUNTER — TELEPHONE (OUTPATIENT)
Dept: CARDIOLOGY | Facility: CLINIC | Age: 76
End: 2021-09-17

## 2021-09-17 RX ORDER — METOPROLOL SUCCINATE 50 MG/1
50 TABLET, EXTENDED RELEASE ORAL DAILY
Qty: 90 TABLET | Refills: 2 | Status: SHIPPED | OUTPATIENT
Start: 2021-09-17 | End: 2021-11-08 | Stop reason: SDUPTHER

## 2021-09-17 NOTE — TELEPHONE ENCOUNTER
----- Message from Facundo Maguire sent at 9/16/2021 11:43 AM EDT -----  Regarding: Non-Urgent Medical Question  Contact: [phone]  After we changed my medicine to 1/2 dose of furosemide I was doing very well until last Thursday when I started  having frequent and urgent urination. I thought maybe it was the Benadryl I had taken the week prior for allergy issues. I have given it a week to get out of my system but I am still having issues. Yesterday I tried taking the full dose of furosemide and potassium and only result was I started itching again. Certain now it is the Potassium. I am asking your opinion of what I can do to help symptoms. I will tell you the perscription for the 50 mg of Metoprolol has not reached the drug store. I am still taking the 25mg. This morning my readings were 164/99, and heart rate 90. Hoping for some relief . Thanks Facundo

## 2021-09-22 ENCOUNTER — TELEPHONE (OUTPATIENT)
Dept: CARDIOLOGY | Facility: CLINIC | Age: 76
End: 2021-09-22

## 2021-09-22 NOTE — TELEPHONE ENCOUNTER
Spoke with pt, she's feeling better and will let us know if this changes.       ----- Message from Tommie Coronado MD sent at 9/17/2021  9:22 AM EDT -----  Regarding: FW: Non-Urgent Medical Question  Contact: [phone]  Tried to call the patient but unable to reach the patient  Prescription for 50 mg of Toprol-XL sent to the pharmacy/[facility]  Not sure if she is talking more about urinary symptoms are reaching if we can discussed with the patient and see what the problem is and what we can do  Urinary symptoms could be from UTI she probably needs to check with the primary care physician for increased frequency to make sure there is no UTI  If there is some concern with the itching and it is secondary to one of the medications let me know we can talk about  ----- Message -----  From: Rosa Maria Andersen MA  Sent: 9/17/2021   8:32 AM EDT  To: Tommie Coronado MD  Subject: FW: Non-Urgent Medical Question                  Portal message below   ----- Message -----  From: Facundo Maguire  Sent: 9/16/2021  11:43 AM EDT  To: [facility] Clinical Pool  Subject: Non-Urgent Medical Question                      After we changed my medicine to 1/2 dose of furosemide I was doing very well until last Thursday when I started  having frequent and urgent urination. I thought maybe it was the Benadryl I had taken the week prior for allergy issues. I have given it a week to get out of my system but I am still having issues. Yesterday I tried taking the full dose of furosemide and potassium and only result was I started itching again. Certain now it is the Potassium. I am asking your opinion of what I can do to help symptoms. I will tell you the perscription for the 50 mg of Metoprolol has not reached the drug store. I am still taking the 25mg. This morning my readings were 164/99, and heart rate 90. Hoping for some relief . Thanks Facundo

## 2021-09-23 DIAGNOSIS — I25.10 CORONARY ARTERY DISEASE INVOLVING NATIVE CORONARY ARTERY OF NATIVE HEART WITHOUT ANGINA PECTORIS: ICD-10-CM

## 2021-09-23 DIAGNOSIS — E78.5 HYPERLIPIDEMIA LDL GOAL <70: ICD-10-CM

## 2021-09-23 DIAGNOSIS — I10 ESSENTIAL HYPERTENSION: Primary | ICD-10-CM

## 2021-09-23 DIAGNOSIS — I48.91 ATRIAL FIBRILLATION WITH RVR (HCC): ICD-10-CM

## 2021-10-19 ENCOUNTER — CLINICAL SUPPORT (OUTPATIENT)
Dept: FAMILY MEDICINE CLINIC | Facility: CLINIC | Age: 76
End: 2021-10-19

## 2021-10-19 DIAGNOSIS — I10 ESSENTIAL HYPERTENSION: ICD-10-CM

## 2021-10-19 DIAGNOSIS — E78.5 HYPERLIPIDEMIA LDL GOAL <70: ICD-10-CM

## 2021-10-19 DIAGNOSIS — I25.10 CORONARY ARTERY DISEASE INVOLVING NATIVE CORONARY ARTERY OF NATIVE HEART WITHOUT ANGINA PECTORIS: ICD-10-CM

## 2021-10-19 DIAGNOSIS — I48.91 ATRIAL FIBRILLATION WITH RVR (HCC): ICD-10-CM

## 2021-10-19 LAB
ANION GAP SERPL CALCULATED.3IONS-SCNC: 12 MMOL/L (ref 5–15)
BUN SERPL-MCNC: 12 MG/DL (ref 8–23)
BUN/CREAT SERPL: 14.3 (ref 7–25)
CALCIUM SPEC-SCNC: 9.4 MG/DL (ref 8.6–10.5)
CHLORIDE SERPL-SCNC: 104 MMOL/L (ref 98–107)
CO2 SERPL-SCNC: 26 MMOL/L (ref 22–29)
CREAT SERPL-MCNC: 0.84 MG/DL (ref 0.57–1)
GFR SERPL CREATININE-BSD FRML MDRD: 66 ML/MIN/1.73
GLUCOSE SERPL-MCNC: 109 MG/DL (ref 65–99)
POTASSIUM SERPL-SCNC: 3.8 MMOL/L (ref 3.5–5.2)
SODIUM SERPL-SCNC: 142 MMOL/L (ref 136–145)

## 2021-10-19 PROCEDURE — 80048 BASIC METABOLIC PNL TOTAL CA: CPT | Performed by: INTERNAL MEDICINE

## 2021-10-19 PROCEDURE — 36415 COLL VENOUS BLD VENIPUNCTURE: CPT | Performed by: INTERNAL MEDICINE

## 2021-10-19 NOTE — PROGRESS NOTES
Venipuncture Blood Specimen Collection  Venipuncture performed in [facility] by Henrique Miranda CMA with good hemostasis. Patient tolerated the procedure well without complications.   10/19/21   Hernique Miranda CMA

## 2021-11-08 RX ORDER — METOPROLOL SUCCINATE 50 MG/1
50 TABLET, EXTENDED RELEASE ORAL DAILY
Qty: 90 TABLET | Refills: 2 | Status: SHIPPED | OUTPATIENT
Start: 2021-11-08 | End: 2022-11-07

## 2021-11-15 RX ORDER — LISINOPRIL 10 MG/1
10 TABLET ORAL DAILY
Qty: 30 TABLET | Refills: 6 | Status: SHIPPED | OUTPATIENT
Start: 2021-11-15 | End: 2022-12-14 | Stop reason: SDUPTHER

## 2021-11-22 RX ORDER — FUROSEMIDE 40 MG/1
40 TABLET ORAL DAILY
Qty: 90 TABLET | Refills: 2 | Status: SHIPPED | OUTPATIENT
Start: 2021-11-22 | End: 2023-01-04

## 2022-04-11 RX ORDER — APIXABAN 5 MG/1
TABLET, FILM COATED ORAL
Qty: 180 TABLET | Refills: 3 | Status: SHIPPED | OUTPATIENT
Start: 2022-04-11 | End: 2023-01-04 | Stop reason: SDUPTHER

## 2022-11-07 RX ORDER — METOPROLOL SUCCINATE 50 MG/1
50 TABLET, EXTENDED RELEASE ORAL DAILY
Qty: 30 TABLET | Refills: 0 | Status: SHIPPED | OUTPATIENT
Start: 2022-11-07 | End: 2023-01-04

## 2022-12-12 RX ORDER — METOPROLOL SUCCINATE 50 MG/1
50 TABLET, EXTENDED RELEASE ORAL DAILY
Qty: 30 TABLET | Refills: 0 | OUTPATIENT
Start: 2022-12-12

## 2022-12-14 RX ORDER — LISINOPRIL 10 MG/1
10 TABLET ORAL DAILY
Qty: 30 TABLET | Refills: 0 | Status: SHIPPED | OUTPATIENT
Start: 2022-12-14 | End: 2023-01-04 | Stop reason: SINTOL

## 2022-12-14 NOTE — TELEPHONE ENCOUNTER
Caller: Facundo Maguire [facility]OS[facility]    Relationship: Self    Best call back number: [phone]    Requested Prescriptions:   Requested Prescriptions     Pending Prescriptions Disp Refills   • lisinopril (PRINIVIL,ZESTRIL) 10 MG tablet 30 tablet 6     Sig: Take 1 tablet by mouth Daily.        Pharmacy where request should be sent: [facility]ACY #[id number] [address] AT [place] [address] - [phone] [facility] [phone] FX     Additional details provided by patient: 10 DAYS LEFT    Does the patient have less than a 3 day supply:  [] Yes  [x] No    Would you like a call back once the refill request has been completed: [x] Yes [] No    If the office needs to give you a call back, can they leave a voicemail: [] Yes [] No    Sandra Mcfarlane Rep   12/14/22 10:23 EST

## 2023-01-04 ENCOUNTER — OFFICE VISIT (OUTPATIENT)
Dept: CARDIOLOGY | Facility: CLINIC | Age: 78
End: 2023-01-04
Payer: MEDICARE

## 2023-01-04 VITALS
BODY MASS INDEX: 31.45 KG/M2 | OXYGEN SATURATION: 97 % | SYSTOLIC BLOOD PRESSURE: 159 MMHG | HEART RATE: 83 BPM | DIASTOLIC BLOOD PRESSURE: 93 MMHG | WEIGHT: 189 LBS

## 2023-01-04 DIAGNOSIS — I48.0 PAROXYSMAL ATRIAL FIBRILLATION: Primary | ICD-10-CM

## 2023-01-04 DIAGNOSIS — Z79.02 LONG TERM CURRENT USE OF ANTITHROMBOTICS/ANTIPLATELETS: ICD-10-CM

## 2023-01-04 DIAGNOSIS — I10 ESSENTIAL HYPERTENSION: ICD-10-CM

## 2023-01-04 DIAGNOSIS — I25.10 CORONARY ARTERY DISEASE INVOLVING NATIVE CORONARY ARTERY OF NATIVE HEART WITHOUT ANGINA PECTORIS: ICD-10-CM

## 2023-01-04 DIAGNOSIS — I36.9 NONRHEUMATIC TRICUSPID VALVE DISORDER: ICD-10-CM

## 2023-01-04 DIAGNOSIS — E78.5 HYPERLIPIDEMIA LDL GOAL <70: ICD-10-CM

## 2023-01-04 PROCEDURE — 99213 OFFICE O/P EST LOW 20 MIN: CPT | Performed by: NURSE PRACTITIONER

## 2023-01-04 PROCEDURE — 1160F RVW MEDS BY RX/DR IN RCRD: CPT | Performed by: NURSE PRACTITIONER

## 2023-01-04 PROCEDURE — 93000 ELECTROCARDIOGRAM COMPLETE: CPT | Performed by: NURSE PRACTITIONER

## 2023-01-04 PROCEDURE — 1159F MED LIST DOCD IN RCRD: CPT | Performed by: NURSE PRACTITIONER

## 2023-01-04 RX ORDER — METOPROLOL SUCCINATE 50 MG/1
75 TABLET, EXTENDED RELEASE ORAL DAILY
Qty: 120 TABLET | Refills: 1 | Status: SHIPPED | OUTPATIENT
Start: 2023-01-04

## 2023-01-04 RX ORDER — DILTIAZEM HYDROCHLORIDE 180 MG/1
180 CAPSULE, COATED, EXTENDED RELEASE ORAL DAILY
COMMUNITY
End: 2023-01-04 | Stop reason: SDUPTHER

## 2023-01-04 RX ORDER — DILTIAZEM HYDROCHLORIDE 180 MG/1
180 CAPSULE, COATED, EXTENDED RELEASE ORAL DAILY
Qty: 90 CAPSULE | Refills: 1 | Status: SHIPPED | OUTPATIENT
Start: 2023-01-04 | End: 2023-01-10 | Stop reason: SDUPTHER

## 2023-01-04 RX ORDER — APIXABAN 5 MG/1
5 TABLET, FILM COATED ORAL 2 TIMES DAILY
Qty: 180 TABLET | Refills: 1 | Status: SHIPPED | OUTPATIENT
Start: 2023-01-04

## 2023-01-04 NOTE — PROGRESS NOTES
[facility] CARDIOLOGY      REASON FOR FOLLOW-UP:  Coronary artery disease  Cardiomyopathy  Atrial fibrillation          Chief Complaint   Patient presents with   • Atrial Fibrillation   • Coronary Artery Disease     Over 1 year f/u.         Dear Coco Johnston APRN        History of Present Illness   It was my pleasure to see Ms. Maguire in the office today.  She is a 77-year-old female with  known history of coronary artery disease, paroxysmal atrial fibrillation status post prior JAVON/cardioversion 12/19/2019, hypertension, cardiomyopathy, congestive heart failure with reduced ejection fraction.  Last office visit 7/12/2021.  She was in atrial fibrillation at that time.  She presents today in follow-up for the above diagnoses.    Ms. Maguire was admitted to [facility] in 2019 for symptoms of shortness of breath, congestive heart failure and A. fib RVR.  Heart cath performed showed mild to moderate obstructive disease involving the LAD and RCA with EF 35 to 40%.  Repeat echo 4/20/2020 with EF improved to 50%.  Pulmonary hypertension was also reported.  She presents today in follow-up for the above-mentioned diagnoses.      Today, Ms. Maguire reports that she feels pretty well.  She does report occasional palpitations without dizziness or lightheadedness.  She has been experiencing right frontal headaches without any focal deficits or vision changes.  She has associated this with a prior neck injury and/or stress.  She also reports persistent nonproductive cough over the past year.  She denies any actual chest pain, pressure or tightness.  She denies orthopnea or PND.  EKG in the office today shows atrial fibrillation with controlled ventricular rate of 83.  Blood pressure is elevated at 159/93.      Lipids reviewed 4/29/2022-within goal, BMP unremarkable      ASSESSMENT:  Persistent atrial fibrillation  Cardiomyopathy-ischemic  Coronary artery disease  Primary  hypertension  NYHA class II-III systolic heart failure      PLAN:  Patient may be having cough from her ACE inhibitor.  I will increase metoprolol to 75 mg daily and discontinue lisinopril to see if her cough resolves.  Patient has had longstanding, persistent atrial fibrillation.  We discussed evaluation by electrophysiologist.  She will consider this.  Follow-up in 3 months or sooner if needed        The following portions of the patient's history were reviewed and updated as appropriate: allergies, current medications, past family history, past medical history, past social history, past surgical history and problem list.    REVIEW OF SYSTEMS:    Review of Systems   Respiratory: Positive for cough.    All other systems reviewed and are negative.      Vitals:    01/04/23 0906   BP: 159/93   Pulse: 83   SpO2: 97%         PHYSICAL EXAM:    General: Alert, cooperative, no distress, appears stated age  Head:  Normocephalic, atraumatic, mucous membranes moist  Eyes:  Conjunctiva/corneas clear, EOM's intact     Neck:  Supple,  no JVD or bruit     Lungs: Clear to auscultation bilaterally, no wheezes rhonchi rales are noted  Chest wall: No tenderness  Musculoskeletal:   Ambulates freely without assistance  Heart::  Regular rate and rhythm, S1 and S2 normal, no murmur, rub or gallop  Abdomen: Soft, non-tender, nondistended, bowel sounds active, no abdominal bruit  Extremities: No cyanosis, clubbing, or edema   Pulses: 2+ and symmetric all extremities  Skin:  No rashes or lesions  Neuro/psych: A&O x3. CN II through XII are grossly intact with appropriate affect        Past Medical History:   Diagnosis Date   • Acute hypokalemia    • Arthritis    • Atrial fibrillation (HCC)    • Fibromyalgia    • Hypertension    • Palpitations        Past Surgical History:   Procedure Laterality Date   • TUBAL ABDOMINAL LIGATION           Current Outpatient Medications:   •  dilTIAZem CD (CARDIZEM CD) 180 MG 24 hr capsule, Take 180 mg by  mouth Daily., Disp: , Rfl:   •  Eliquis 5 MG tablet tablet, TAKE 1 TABLET BY MOUTH TWICE A DAY, Disp: 180 tablet, Rfl: 3  •  lansoprazole (PREVACID) 30 MG capsule, Take 30 mg by mouth Daily., Disp: , Rfl:   •  lisinopril (PRINIVIL,ZESTRIL) 10 MG tablet, Take 1 tablet by mouth Daily., Disp: 30 tablet, Rfl: 0  •  metoprolol succinate XL (TOPROL-XL) 50 MG 24 hr tablet, Take 1 tablet by mouth Daily. Must be seen for additional refills, Disp: 30 tablet, Rfl: 0  •  furosemide (LASIX) 40 MG tablet, Take 1 tablet by mouth Daily., Disp: 90 tablet, Rfl: 2  •  potassium chloride 10 MEQ CR tablet, TAKE 1 TABLET BY MOUTH WITH EACH DOSE OF LASIX (FUROSEMIDE), Disp: 30 tablet, Rfl: 6    Allergies   Allergen Reactions   • Cyclobenzaprine Palpitations   • Doxazosin Mesylate Other (See Comments)     itching   • Lisinopril Cough   • Tetracycline Nausea Only   • Valsartan Itching     Itching , constant urination , nausea    • Hydrochlorothiazide Other (See Comments)     Constant urination        Family History   Problem Relation Age of Onset   • Thyroid disease Sister    • Cancer Brother        Social History     Tobacco Use   • Smoking status: Never   • Smokeless tobacco: Never   Substance Use Topics   • Alcohol use: No           Current Electrocardiogram:    ECG 12 Lead    Date/Time: 1/4/2023 10:21 AM  Performed by: Jennifer Gabriel APRN  Authorized by: Jennifer Gabriel APRN   Comparison: not compared with previous ECG   Previous ECG: no previous ECG available  Rhythm: atrial fibrillation  BPM: 83  Q waves: aVF                    EMR Dragon/Transcription:   \"Dictated utilizing Dragon dictation\".

## 2023-01-10 ENCOUNTER — TELEPHONE (OUTPATIENT)
Dept: CARDIOLOGY | Facility: CLINIC | Age: 78
End: 2023-01-10
Payer: MEDICARE

## 2023-01-10 RX ORDER — DILTIAZEM HYDROCHLORIDE 120 MG/1
120 CAPSULE, EXTENDED RELEASE ORAL DAILY
Qty: 30 CAPSULE | Refills: 11 | Status: SHIPPED | OUTPATIENT
Start: 2023-01-10 | End: 2023-01-10

## 2023-01-10 RX ORDER — DILTIAZEM HYDROCHLORIDE 120 MG/1
120 CAPSULE, EXTENDED RELEASE ORAL DAILY
Qty: 90 CAPSULE | Refills: 3 | Status: SHIPPED | OUTPATIENT
Start: 2023-01-10

## 2023-01-10 NOTE — TELEPHONE ENCOUNTER
----- Message from SYBIL Urena sent at 1/10/2023 12:48 PM EST -----  OK.  I sent it.  ----- Message -----  From: Gi Rebolledo MA  Sent: 1/10/2023  12:32 PM EST  To: SYBIL Urena    I checked with the pharmacy and Dr Padilla has been filling the 120. She wants to stay with the 120. But wants us to take it over.   ----- Message -----  From: Jennifer Gabriel APRN  Sent: 1/10/2023  12:26 PM EST  To: Gi Rebolledo MA    I refilled what was in her medication list.  The only change I made was metoprolol, but if she says she was on 120 mg then I'll change it to 120 mg.    ----- Message -----  From: Gi Rebolledo MA  Sent: 1/10/2023  10:56 AM EST  To: SYBIL Urena    Pt called and stated she has been taking Diltiazem 120 mg once daily, but we sent 180 once daily. I do not see where previously in the chart she was on 180. Do you want to send her a 120 rx?

## 2023-03-15 ENCOUNTER — OFFICE VISIT (OUTPATIENT)
Dept: CARDIOLOGY | Facility: CLINIC | Age: 78
End: 2023-03-15
Payer: MEDICARE

## 2023-03-15 VITALS
WEIGHT: 192 LBS | SYSTOLIC BLOOD PRESSURE: 145 MMHG | HEART RATE: 84 BPM | OXYGEN SATURATION: 98 % | BODY MASS INDEX: 31.95 KG/M2 | DIASTOLIC BLOOD PRESSURE: 84 MMHG

## 2023-03-15 DIAGNOSIS — I10 ESSENTIAL HYPERTENSION: Primary | ICD-10-CM

## 2023-03-15 DIAGNOSIS — I50.22 CHRONIC SYSTOLIC CONGESTIVE HEART FAILURE: ICD-10-CM

## 2023-03-15 DIAGNOSIS — Z79.02 LONG TERM CURRENT USE OF ANTITHROMBOTICS/ANTIPLATELETS: ICD-10-CM

## 2023-03-15 DIAGNOSIS — I48.19 ATRIAL FIBRILLATION, PERSISTENT: ICD-10-CM

## 2023-03-15 DIAGNOSIS — I25.10 CORONARY ARTERY DISEASE INVOLVING NATIVE CORONARY ARTERY OF NATIVE HEART WITHOUT ANGINA PECTORIS: ICD-10-CM

## 2023-03-15 PROCEDURE — 3077F SYST BP >= 140 MM HG: CPT | Performed by: NURSE PRACTITIONER

## 2023-03-15 PROCEDURE — 99213 OFFICE O/P EST LOW 20 MIN: CPT | Performed by: NURSE PRACTITIONER

## 2023-03-15 PROCEDURE — 1160F RVW MEDS BY RX/DR IN RCRD: CPT | Performed by: NURSE PRACTITIONER

## 2023-03-15 PROCEDURE — 3079F DIAST BP 80-89 MM HG: CPT | Performed by: NURSE PRACTITIONER

## 2023-03-15 PROCEDURE — 1159F MED LIST DOCD IN RCRD: CPT | Performed by: NURSE PRACTITIONER

## 2023-03-15 NOTE — PROGRESS NOTES
[facility] CARDIOLOGY      REASON FOR FOLLOW-UP:  Coronary artery disease  Cardiomyopathy  Atrial fibrillation          Chief Complaint   Patient presents with   • Congestive Heart Failure     2 month f/u   • Coronary Artery Disease   • Atrial Fibrillation         Dear Coco Johnston, SYBIL        History of Present Illness   It was my pleasure to see Ms. Maguire in the office today.  She is a 77-year-old female with  known history of coronary artery disease, paroxysmal atrial fibrillation status post prior JAVON/cardioversion 12/19/2019, hypertension, cardiomyopathy, congestive heart failure with reduced ejection fraction.      Last heart catheterization 2019showed mild to moderate obstructive disease involving the LAD and RCA with EF 35 to 40%.  Repeat echo 4/20/2020 with EF improved to 50%.  Pulmonary hypertension was also reported.     At last office visit Ms. Maguire reported occasional fleeting palpitations that were not particularly bothersome to her.  She also reported persistent cough with concern for ACE cough.  ACE inhibitor was discontinued and BB dose increased for BP coverage.  She did contact our office stating the increase BB dosing caused significant dizziness and leg weakness.  She resumed her prior dose of 50 mg daily.  Her cough has improved significantly.  Blood pressure in the office today elevated at 176/98, retake 145/84.  She denies any headache, vision changes or paresthesias.  She denies any chest pain, pressure or tightness.  No shortness of breath at rest, dyspnea with exertion, lower extremity edema or orthopnea.  No further dizziness, no near syncopal or syncopal episodes.  She denies any abnormal bleeding.      ASSESSMENT:  Persistent atrial fibrillation  Cardiomyopathy-ischemic  Coronary artery disease  Primary hypertension  NYHA class II-III systolic heart failure    PLAN:  Patient will check blood pressures a.m./p.m. and call our office in 1 week with  those values to determine if medication adjustments should be made.  Follow-up primary care, surveillance labs your office  Continue risk factor modification  I have discussed evaluation by electrophysiologist for any recommendations for A-fib and at this time she has declined.  Follow-up in 6 months or sooner if needed      Diagnoses and all orders for this visit:    1. Essential hypertension (Primary)    2. Coronary artery disease involving native coronary artery of native heart without angina pectoris    3. Chronic systolic congestive heart failure (HCC)    4. Long term current use of antithrombotics/antiplatelets    5. Atrial fibrillation, persistent (HCC)          The following portions of the patient's history were reviewed and updated as appropriate: allergies, current medications, past family history, past medical history, past social history, past surgical history and problem list.    REVIEW OF SYSTEMS:    Review of Systems   All other systems reviewed and are negative.      Vitals:    03/15/23 0857   BP: 145/84   Pulse:    SpO2:          PHYSICAL EXAM:    General: Alert, cooperative, no distress, appears stated age  Head:  Normocephalic, atraumatic, mucous membranes moist  Eyes:  Conjunctiva/corneas clear, EOM's intact     Neck:  Supple,  no JVD or bruit     Lungs: Clear to auscultation bilaterally, no wheezes rhonchi rales are noted  Chest wall: No tenderness  Musculoskeletal:   Ambulates freely without assistance  Heart::  Irregularly irregular rate and rhythm, S1 and S2 normal, no murmur, rub or gallop  Abdomen: Soft, non-tender, nondistended, bowel sounds active, no abdominal bruit  Extremities: No cyanosis, clubbing, or edema   Pulses: 2+ and symmetric all extremities  Skin:  No rashes or lesions  Neuro/psych: A&O x3. CN II through XII are grossly intact with appropriate affect        Past Medical History:   Diagnosis Date   • Acute hypokalemia    • Arthritis    • Atrial fibrillation (HCC)    •  "Fibromyalgia    • Hypertension    • Palpitations        Past Surgical History:   Procedure Laterality Date   • TUBAL ABDOMINAL LIGATION           Current Outpatient Medications:   •  dilTIAZem XR (DILACOR XR) 120 MG 24 hr capsule, Take 1 capsule by mouth Daily., Disp: 90 capsule, Rfl: 3  •  Eliquis 5 MG tablet tablet, Take 1 tablet by mouth 2 (Two) Times a Day., Disp: 180 tablet, Rfl: 1  •  lansoprazole (PREVACID) 30 MG capsule, Take 1 capsule by mouth Daily., Disp: , Rfl:   •  metoprolol succinate XL (TOPROL-XL) 50 MG 24 hr tablet, Take 1.5 tablets by mouth Daily. Must be seen for additional refills (Patient taking differently: Take 1 tablet by mouth Daily.), Disp: 120 tablet, Rfl: 1    Allergies   Allergen Reactions   • Cyclobenzaprine Palpitations   • Doxazosin Mesylate Other (See Comments)     itching   • Lisinopril Cough   • Tetracycline Nausea Only   • Valsartan Itching     Itching , constant urination , nausea    • Hydrochlorothiazide Other (See Comments)     Constant urination        Family History   Problem Relation Age of Onset   • Thyroid disease Sister    • Cancer Brother        Social History     Tobacco Use   • Smoking status: Never   • Smokeless tobacco: Never   Substance Use Topics   • Alcohol use: No           Current Electrocardiogram:  Procedures        EMR Dragon/Transcription:   \"Dictated utilizing Dragon dictation\".       "

## 2023-09-13 ENCOUNTER — OFFICE VISIT (OUTPATIENT)
Dept: CARDIOLOGY | Facility: CLINIC | Age: 78
End: 2023-09-13
Payer: MEDICARE

## 2023-09-13 VITALS
OXYGEN SATURATION: 96 % | BODY MASS INDEX: 31.62 KG/M2 | SYSTOLIC BLOOD PRESSURE: 180 MMHG | WEIGHT: 190 LBS | HEART RATE: 106 BPM | DIASTOLIC BLOOD PRESSURE: 108 MMHG

## 2023-09-13 DIAGNOSIS — D65 CONSUMPTIVE COAGULOPATHY: ICD-10-CM

## 2023-09-13 DIAGNOSIS — Z79.02 LONG TERM CURRENT USE OF ANTITHROMBOTICS/ANTIPLATELETS: ICD-10-CM

## 2023-09-13 DIAGNOSIS — I10 ESSENTIAL HYPERTENSION: ICD-10-CM

## 2023-09-13 DIAGNOSIS — I48.20 ATRIAL FIBRILLATION, CHRONIC: Primary | ICD-10-CM

## 2023-09-13 RX ORDER — DILTIAZEM HYDROCHLORIDE 180 MG/1
180 CAPSULE, EXTENDED RELEASE ORAL DAILY
Qty: 30 CAPSULE | Refills: 11 | Status: SHIPPED | OUTPATIENT
Start: 2023-09-13

## 2023-09-13 NOTE — PROGRESS NOTES
[facility] CARDIOLOGY      REASON FOR FOLLOW-UP:  Coronary artery disease  Cardiomyopathy  Atrial fibrillation          Chief Complaint   Patient presents with    Atrial Fibrillation     6 month f/u    Coronary Artery Disease         Dear Coco Johnston APRN        History of Present Illness   It was my pleasure to see Ms. Maguire in the office today.  She is a 78-year-old female with  known history of coronary artery disease, paroxysmal atrial fibrillation status post prior JAVON/cardioversion 12/19/2019, hypertension, cardiomyopathy, congestive heart failure with reduced ejection fraction.       Last heart catheterization 2019 showed mild to moderate obstructive disease involving the LAD and RCA with EF 35 to 40%.  Repeat echo 4/20/2020 with EF improved to 50%.  Pulmonary hypertension was also reported.    Ms. Maguire experienced a fall on July 1 with significant fractures to her left lower extremity requiring surgical repair.  She is i wearing an orthosis and reports significant pain today this appears to be reflected in her blood pressure readings at 196/115, repeat manual pressure 180/108.  We did try increasing her beta-blocker during last office visit because ACE inhibitor was discontinued secondary to cough.  She was intolerant to the increased beta-blocker and reported dizziness.  There apparently has been some confusion about her diltiazem dose in the past.  At some point she was receiving 180 mg tablets.  She denies any chest discomfort, shortness of breath, lower extremity edema, dizziness or lightheadedness.  She is reporting a burning pain in her injured foot.      Labs reviewed 4/29/2022: Lipids within goal, BMP unremarkable      ASSESSMENT:  Persistent atrial fibrillation  Recent left lower extremity fracture from mechanical fall  Cardiomyopathy-ischemic  Coronary artery disease  Primary hypertension  NYHA class II-III systolic heart failure    PLAN:  The patient does  need better blood pressure control despite her elevation today that I believe is results of pain.  I will go ahead and increase diltiazem to 180 mg.  She has documented mild allergy to valsartan.  We will hold off adding any medication pending results of increased diltiazem.  She will check pressures at home and call if not controlled at less than 140 systolic, less than 85 diastolic.  As per previous notes, I have discussed evaluation by electrophysiologist for further recommendations of A-fib management in the past and she has declined that offer.  She did ask about a cheaper alternative to Eliquis.  She does not wish to go back on warfarin unless all other options are looked at.  We will supply samples    Diagnoses and all orders for this visit:    1. Essential hypertension (Primary)    2. Atrial fibrillation, chronic    3. Long term current use of antithrombotics/antiplatelets    4. Consumptive coagulopathy    Other orders  -     dilTIAZem XR (DILACOR XR) 180 MG 24 hr capsule; Take 1 capsule by mouth Daily.  Dispense: 30 capsule; Refill: 11  -     rivaroxaban (XARELTO) 20 MG tablet; Take 1 tablet by mouth Daily.  Dispense: 30 tablet; Refill: 11          The following portions of the patient's history were reviewed and updated as appropriate: allergies, current medications, past family history, past medical history, past social history, past surgical history, and problem list.    REVIEW OF SYSTEMS:    Review of Systems   Musculoskeletal:  Positive for joint pain.   All other systems reviewed and are negative.    Vitals:    09/13/23 0909   BP: (!) 180/108   Pulse:    SpO2:          PHYSICAL EXAM:    General: Alert, cooperative, no distress, appears stated age  Head:  Normocephalic, atraumatic, mucous membranes moist  Eyes:  Conjunctiva/corneas clear, EOM's intact     Neck:  Supple,  no JVD or bruit     Lungs: Clear to auscultation bilaterally, no wheezes rhonchi rales are noted  Chest wall: No  tenderness  Musculoskeletal:   Ambulates with assistance of a walker  Heart::  Irregularly irregular rate and rhythm, S1 and S2 normal, no murmur, rub or gallop  Abdomen: Soft, non-tender, nondistended, bowel sounds active, no abdominal bruit  Extremities: No edema.  Orthosis left lower extremity  Pulses: 2+ and symmetric all extremities  Skin:  No rashes or lesions  Neuro/psych: A&O x3. CN II through XII are grossly intact with appropriate affect        Past Medical History:   Diagnosis Date    Acute hypokalemia     Arthritis     Atrial fibrillation     Fibromyalgia     Hypertension     Palpitations        Past Surgical History:   Procedure Laterality Date    ANKLE SURGERY Left     Triple fracture    TUBAL ABDOMINAL LIGATION           Current Outpatient Medications:     Eliquis 5 MG tablet tablet, Take 1 tablet by mouth 2 (Two) Times a Day., Disp: 180 tablet, Rfl: 1    lansoprazole (PREVACID) 30 MG capsule, Take 1 capsule by mouth Daily., Disp: , Rfl:     metoprolol succinate XL (TOPROL-XL) 50 MG 24 hr tablet, Take 1 tablet by mouth Daily., Disp: 30 tablet, Rfl: 1    dilTIAZem XR (DILACOR XR) 180 MG 24 hr capsule, Take 1 capsule by mouth Daily., Disp: 30 capsule, Rfl: 11    rivaroxaban (XARELTO) 20 MG tablet, Take 1 tablet by mouth Daily., Disp: 30 tablet, Rfl: 11    Allergies   Allergen Reactions    Cyclobenzaprine Palpitations    Doxazosin Mesylate Other (See Comments)     itching    Lisinopril Cough    Tetracycline Nausea Only    Valsartan Itching     Itching , constant urination , nausea     Hydrochlorothiazide Other (See Comments)     Constant urination        Family History   Problem Relation Age of Onset    Thyroid disease Sister     Cancer Brother        Social History     Tobacco Use    Smoking status: Never    Smokeless tobacco: Never   Substance Use Topics    Alcohol use: No           Current Electrocardiogram:    ECG 12 Lead    Date/Time: 9/13/2023 1:02 PM  Performed by: Jennifer Gabriel  "APRN  Authorized by: Jennifer Gabriel, APRN   Comparison: not compared with previous ECG   Previous ECG: no previous ECG available  Rhythm: atrial fibrillation  BPM: 106            EMR Dragon/Transcription:   \"Dictated utilizing Dragon dictation\".       "

## 2023-09-20 ENCOUNTER — TELEPHONE (OUTPATIENT)
Dept: CARDIOLOGY | Facility: CLINIC | Age: 78
End: 2023-09-20
Payer: MEDICARE

## 2023-09-20 RX ORDER — LOSARTAN POTASSIUM 50 MG/1
50 TABLET ORAL DAILY
Qty: 30 TABLET | Refills: 1 | Status: SHIPPED | OUTPATIENT
Start: 2023-09-20

## 2023-09-20 NOTE — TELEPHONE ENCOUNTER
----- Message from SYBIL Urena sent at 9/20/2023  2:33 PM EDT -----  The 180 mg diltiazem doesn't seem to be helping her blood pressure.  If we add the Losartan 50 mg, she can go back to 120 mg diltiazem.  ----- Message -----  From: Gi Rebolledo MA  Sent: 9/20/2023   2:21 PM EDT  To: SYBIL Urena    Pt wants to know if she resumes the 120 dosage of the diltiazem or stay on the 180?  ----- Message -----  From: Jennifer Gabriel APRN  Sent: 9/20/2023   1:27 PM EDT  To: Gi Rebolledo MA    We discussed antihypertensive medications at her last office visit.  I am reluctant to add an ACE because of prior cough.  If she is willing to try losartan (she reported some itching with valsartan in the past), I will start losartan 50 mg daily.  If she does not want to go that route, I will probably have to add doxazosin.  My preference would be losartan if she is agreeable.  Thanks  ----- Message -----  From: Gi Rebolledo MA  Sent: 9/20/2023   1:21 PM EDT  To: SYBIL Urena    Pt sent following message  I have been checking my blood pressure the last four days and I am yet to get a good reading. Highest reading was 199/104 and lowest was 137/94. I am taking the 180 dilTIAZem, and checking it various times of the day. I have returned to work, still experiencing pain with my ankle, and taking Tylenol as needed. Need some advice. Thanks Facundo  Please advise

## 2023-09-27 RX ORDER — LOSARTAN POTASSIUM 100 MG/1
100 TABLET ORAL DAILY
Qty: 90 TABLET | Refills: 3 | Status: SHIPPED | OUTPATIENT
Start: 2023-09-27

## 2024-01-03 RX ORDER — APIXABAN 5 MG/1
5 TABLET, FILM COATED ORAL 2 TIMES DAILY
Qty: 180 TABLET | Refills: 1 | Status: SHIPPED | OUTPATIENT
Start: 2024-01-03

## 2024-02-09 RX ORDER — METOPROLOL SUCCINATE 50 MG/1
50 TABLET, EXTENDED RELEASE ORAL DAILY
Qty: 30 TABLET | Refills: 1 | Status: SHIPPED | OUTPATIENT
Start: 2024-02-09

## 2024-03-13 ENCOUNTER — OFFICE VISIT (OUTPATIENT)
Dept: CARDIOLOGY | Facility: CLINIC | Age: 79
End: 2024-03-13
Payer: MEDICARE

## 2024-03-13 VITALS
OXYGEN SATURATION: 97 % | HEART RATE: 88 BPM | BODY MASS INDEX: 32.45 KG/M2 | SYSTOLIC BLOOD PRESSURE: 172 MMHG | WEIGHT: 195 LBS | DIASTOLIC BLOOD PRESSURE: 111 MMHG

## 2024-03-13 DIAGNOSIS — I48.19 ATRIAL FIBRILLATION, PERSISTENT: Primary | ICD-10-CM

## 2024-03-13 DIAGNOSIS — I10 ESSENTIAL HYPERTENSION: ICD-10-CM

## 2024-03-13 DIAGNOSIS — I25.10 CORONARY ARTERY DISEASE INVOLVING NATIVE CORONARY ARTERY OF NATIVE HEART WITHOUT ANGINA PECTORIS: ICD-10-CM

## 2024-03-13 PROCEDURE — 1159F MED LIST DOCD IN RCRD: CPT | Performed by: NURSE PRACTITIONER

## 2024-03-13 PROCEDURE — 93000 ELECTROCARDIOGRAM COMPLETE: CPT | Performed by: NURSE PRACTITIONER

## 2024-03-13 PROCEDURE — 3077F SYST BP >= 140 MM HG: CPT | Performed by: NURSE PRACTITIONER

## 2024-03-13 PROCEDURE — 99214 OFFICE O/P EST MOD 30 MIN: CPT | Performed by: NURSE PRACTITIONER

## 2024-03-13 PROCEDURE — 1160F RVW MEDS BY RX/DR IN RCRD: CPT | Performed by: NURSE PRACTITIONER

## 2024-03-13 PROCEDURE — 3079F DIAST BP 80-89 MM HG: CPT | Performed by: NURSE PRACTITIONER

## 2024-03-13 RX ORDER — DILTIAZEM HYDROCHLORIDE 180 MG/1
180 CAPSULE, EXTENDED RELEASE ORAL DAILY
Qty: 90 CAPSULE | Refills: 3 | Status: SHIPPED | OUTPATIENT
Start: 2024-03-13

## 2024-03-13 RX ORDER — DILTIAZEM HYDROCHLORIDE 120 MG/1
120 CAPSULE, EXTENDED RELEASE ORAL DAILY
COMMUNITY
End: 2024-03-13

## 2024-03-13 NOTE — PROGRESS NOTES
[facility] CARDIOLOGY      REASON FOR FOLLOW-UP:  Coronary artery disease  Cardiomyopathy  Atrial fibrillation          Chief Complaint   Patient presents with   • Atrial Fibrillation     6 month f/u-wants to talk to you about new medication Dr. Padilla wants to start her on.   • Hypertension         Dear Randy, Frank MELISSA MD        History of Present Illness   It was my pleasure to see Ms. Maguire in the office today.  She is a 78-year-old female with  known history of coronary artery disease, paroxysmal atrial fibrillation status post prior JAVON/cardioversion 12/19/2019, hypertension, cardiomyopathy, congestive heart failure with reduced ejection fraction.       Last heart catheterization 2019 showed mild to moderate obstructive disease involving the LAD and RCA with EF 35 to 40%.  Repeat echo 4/20/2020 with EF improved to 50%.  Pulmonary hypertension was also reported with mild MR/TR, moderate LAE..    The patient's antihypertensive medication was recently changed due to poor control.  Losartan 50 mg was added despite prior symptoms of itching with valsartan.  This has been increased to 100 mg daily.  She reports that thiazide diuretic-hydrochlorothiazide 25 mg-was recently added to her antihypertensive regimen, however she began experiencing loss of hearing in her left ear as well as external pain left side of her head.  She self discontinued with some improvement in her symptoms.  She still has decreased hearing in her left ear.  She does monitor her blood pressure routinely at home and reports suboptimal readings.  She has multiple allergies and intolerances to medications.  We discussed options and her preference would be to titrate rather than add medications.  She denies any complaints of chest discomfort, shortness of breath out of character for her.  No dizziness, lightheadedness, near syncopal or syncopal episodes.  No recent lower extremity edema.      Primary care office  visit reviewed from 12/22/2023 with blood pressure improved at 140/66.      ASSESSMENT:  Persistent atrial fibrillation  Recent left lower extremity fracture from mechanical fall  Cardiomyopathy-ischemic  Coronary artery disease  Primary hypertension  NYHA class II-III systolic heart failure    PLAN:  Surveillance labs per primary-however patient has not had had labs completed since 2022.  I will send order for lipids, CBC, BMP  Increase diltiazem back to 180 mg daily.  Patient to monitor blood pressures at home and call if not less than 145/90.  We discussed increasing beta-blocker if needed.  Follow-up in 6 months or sooner if if needed      Diagnoses and all orders for this visit:    1. Essential hypertension (Primary)  -     Basic Metabolic Panel; Future  -     CBC (No Diff); Future  -     Lipid Panel; Future    2. Atrial fibrillation, persistent  -     Basic Metabolic Panel; Future  -     CBC (No Diff); Future  -     Lipid Panel; Future    3. Coronary artery disease involving native coronary artery of native heart without angina pectoris  -     Basic Metabolic Panel; Future  -     CBC (No Diff); Future  -     Lipid Panel; Future    Other orders  -     dilTIAZem XR (DILACOR XR) 180 MG 24 hr capsule; Take 1 capsule by mouth Daily.  Dispense: 90 capsule; Refill: 3          The following portions of the patient's history were reviewed and updated as appropriate: allergies, current medications, past family history, past medical history, past social history, past surgical history, and problem list.    REVIEW OF SYSTEMS:    Review of Systems   HENT:  Positive for ear pain.         Hearing loss left ear   All other systems reviewed and are negative.      Vitals:    03/13/24 0916   BP: (!) 172/111   Pulse:    SpO2:          PHYSICAL EXAM:    General: Alert, cooperative, no distress, appears stated age  Head:  Normocephalic, atraumatic, mucous membranes moist  Eyes:  Conjunctiva/corneas clear, EOM's intact      Neck:  Supple,  no JVD or bruit     Lungs: Clear to auscultation bilaterally, no wheezes rhonchi rales are noted  Chest wall: No tenderness  Musculoskeletal:   Ambulates freely without assistance  Heart::  Regular rate and rhythm, S1 and S2 normal, no murmur, rub or gallop  Abdomen: Soft, non-tender, nondistended, bowel sounds active, no abdominal bruit  Extremities: No cyanosis, clubbing, or edema   Pulses: 2+ and symmetric all extremities  Skin:  No rashes or lesions  Neuro/psych: A&O x3. CN II through XII are grossly intact with appropriate affect        Past Medical History:   Diagnosis Date   • Acute hypokalemia    • Arthritis    • Atrial fibrillation    • Fibromyalgia    • Hypertension    • Palpitations        Past Surgical History:   Procedure Laterality Date   • ANKLE SURGERY Left     Triple fracture   • TUBAL ABDOMINAL LIGATION           Current Outpatient Medications:   •  dilTIAZem XR (DILACOR XR) 180 MG 24 hr capsule, Take 1 capsule by mouth Daily., Disp: 90 capsule, Rfl: 3  •  Eliquis 5 MG tablet tablet, Take 1 tablet by mouth 2 (Two) Times a Day., Disp: 180 tablet, Rfl: 1  •  lansoprazole (PREVACID) 30 MG capsule, Take 1 capsule by mouth Daily., Disp: , Rfl:   •  losartan (Cozaar) 100 MG tablet, Take 1 tablet by mouth Daily., Disp: 90 tablet, Rfl: 3  •  metoprolol succinate XL (TOPROL-XL) 50 MG 24 hr tablet, TAKE 1 TABLET BY MOUTH EVERY DAY, Disp: 30 tablet, Rfl: 1    Allergies   Allergen Reactions   • Cyclobenzaprine Palpitations   • Doxazosin Mesylate Other (See Comments)     itching   • Lisinopril Cough   • Tetracycline Nausea Only   • Valsartan Itching     Itching , constant urination , nausea    • Hydrochlorothiazide Other (See Comments)     Constant urination        Family History   Problem Relation Age of Onset   • Thyroid disease Sister    • Cancer Brother        Social History     Tobacco Use   • Smoking status: Never   • Smokeless tobacco: Never   Substance Use Topics   • Alcohol use: No  "          Current Electrocardiogram:    ECG 12 Lead    Date/Time: 3/13/2024 10:18 AM  Performed by: Jennifer Gabriel APRN    Authorized by: Jennifer Gabriel APRN  Comparison: compared with previous ECG from 9/13/2023  Similar to previous ECG  Rhythm: atrial fibrillation  BPM: 88            EMR Dragon/Transcription:   \"Dictated utilizing Dragon dictation\".     Copied text in this note has been reviewed by me and is accurate as of 03/13/24.    "

## 2024-03-25 ENCOUNTER — CLINICAL SUPPORT (OUTPATIENT)
Dept: FAMILY MEDICINE CLINIC | Facility: CLINIC | Age: 79
End: 2024-03-25
Payer: MEDICARE

## 2024-03-25 DIAGNOSIS — I25.10 CORONARY ARTERY DISEASE INVOLVING NATIVE CORONARY ARTERY OF NATIVE HEART WITHOUT ANGINA PECTORIS: ICD-10-CM

## 2024-03-25 DIAGNOSIS — I48.19 ATRIAL FIBRILLATION, PERSISTENT: ICD-10-CM

## 2024-03-25 DIAGNOSIS — I10 ESSENTIAL HYPERTENSION: ICD-10-CM

## 2024-03-25 PROCEDURE — 80048 BASIC METABOLIC PNL TOTAL CA: CPT | Performed by: NURSE PRACTITIONER

## 2024-03-25 PROCEDURE — 85027 COMPLETE CBC AUTOMATED: CPT | Performed by: NURSE PRACTITIONER

## 2024-03-25 PROCEDURE — 36415 COLL VENOUS BLD VENIPUNCTURE: CPT | Performed by: REGISTERED NURSE

## 2024-03-25 PROCEDURE — 80061 LIPID PANEL: CPT | Performed by: NURSE PRACTITIONER

## 2024-03-25 NOTE — PROGRESS NOTES
Venipuncture Blood Specimen Collection  Venipuncture performed in rt arm via venipuncture by Sandra Ellsworth with good hemostasis. Patient tolerated the procedure well without complications.   03/25/24   Sandra Ellsworth

## 2024-03-26 LAB
ANION GAP SERPL CALCULATED.3IONS-SCNC: 10 MMOL/L (ref 5–15)
BUN SERPL-MCNC: 12 MG/DL (ref 8–23)
BUN/CREAT SERPL: 12.1 (ref 7–25)
CALCIUM SPEC-SCNC: 9.2 MG/DL (ref 8.6–10.5)
CHLORIDE SERPL-SCNC: 106 MMOL/L (ref 98–107)
CHOLEST SERPL-MCNC: 170 MG/DL (ref 0–200)
CO2 SERPL-SCNC: 25 MMOL/L (ref 22–29)
CREAT SERPL-MCNC: 0.99 MG/DL (ref 0.57–1)
DEPRECATED RDW RBC AUTO: 41.2 FL (ref 37–54)
EGFRCR SERPLBLD CKD-EPI 2021: 59.2 ML/MIN/1.73
ERYTHROCYTE [DISTWIDTH] IN BLOOD BY AUTOMATED COUNT: 12.4 % (ref 12.3–15.4)
GLUCOSE SERPL-MCNC: 97 MG/DL (ref 65–99)
HCT VFR BLD AUTO: 37.6 % (ref 34–46.6)
HDLC SERPL-MCNC: 56 MG/DL (ref 40–60)
HGB BLD-MCNC: 12.3 G/DL (ref 12–15.9)
LDLC SERPL CALC-MCNC: 97 MG/DL (ref 0–100)
LDLC/HDLC SERPL: 1.71 {RATIO}
MCH RBC QN AUTO: 30.1 PG (ref 26.6–33)
MCHC RBC AUTO-ENTMCNC: 32.7 G/DL (ref 31.5–35.7)
MCV RBC AUTO: 91.9 FL (ref 79–97)
PLATELET # BLD AUTO: 234 10*3/MM3 (ref 140–450)
PMV BLD AUTO: 10.8 FL (ref 6–12)
POTASSIUM SERPL-SCNC: 4 MMOL/L (ref 3.5–5.2)
RBC # BLD AUTO: 4.09 10*6/MM3 (ref 3.77–5.28)
SODIUM SERPL-SCNC: 141 MMOL/L (ref 136–145)
TRIGL SERPL-MCNC: 90 MG/DL (ref 0–150)
VLDLC SERPL-MCNC: 17 MG/DL (ref 5–40)
WBC NRBC COR # BLD AUTO: 7.02 10*3/MM3 (ref 3.4–10.8)

## 2024-04-04 RX ORDER — METOPROLOL SUCCINATE 50 MG/1
50 TABLET, EXTENDED RELEASE ORAL DAILY
Qty: 30 TABLET | Refills: 1 | Status: SHIPPED | OUTPATIENT
Start: 2024-04-04

## 2024-06-10 RX ORDER — METOPROLOL SUCCINATE 50 MG/1
50 TABLET, EXTENDED RELEASE ORAL DAILY
Qty: 30 TABLET | Refills: 1 | Status: SHIPPED | OUTPATIENT
Start: 2024-06-10

## 2024-07-29 RX ORDER — APIXABAN 5 MG/1
5 TABLET, FILM COATED ORAL 2 TIMES DAILY
Qty: 180 TABLET | Refills: 1 | Status: SHIPPED | OUTPATIENT
Start: 2024-07-29

## 2024-09-20 RX ORDER — LOSARTAN POTASSIUM 100 MG/1
100 TABLET ORAL DAILY
Qty: 90 TABLET | Refills: 3 | Status: SHIPPED | OUTPATIENT
Start: 2024-09-20

## 2024-09-25 ENCOUNTER — OFFICE VISIT (OUTPATIENT)
Dept: CARDIOLOGY | Facility: CLINIC | Age: 79
End: 2024-09-25
Payer: MEDICARE

## 2024-09-25 VITALS
OXYGEN SATURATION: 98 % | SYSTOLIC BLOOD PRESSURE: 163 MMHG | WEIGHT: 196 LBS | BODY MASS INDEX: 32.62 KG/M2 | DIASTOLIC BLOOD PRESSURE: 102 MMHG | HEART RATE: 96 BPM

## 2024-09-25 DIAGNOSIS — I25.10 CORONARY ARTERY DISEASE INVOLVING NATIVE CORONARY ARTERY OF NATIVE HEART WITHOUT ANGINA PECTORIS: ICD-10-CM

## 2024-09-25 DIAGNOSIS — I10 ESSENTIAL HYPERTENSION: ICD-10-CM

## 2024-09-25 DIAGNOSIS — I36.9 NONRHEUMATIC TRICUSPID VALVE DISORDER: ICD-10-CM

## 2024-09-25 DIAGNOSIS — D65 CONSUMPTIVE COAGULOPATHY: ICD-10-CM

## 2024-09-25 DIAGNOSIS — I48.19 ATRIAL FIBRILLATION, PERSISTENT: Primary | ICD-10-CM

## 2024-09-25 PROCEDURE — 3080F DIAST BP >= 90 MM HG: CPT | Performed by: NURSE PRACTITIONER

## 2024-09-25 PROCEDURE — 99214 OFFICE O/P EST MOD 30 MIN: CPT | Performed by: NURSE PRACTITIONER

## 2024-09-25 PROCEDURE — 3077F SYST BP >= 140 MM HG: CPT | Performed by: NURSE PRACTITIONER

## 2024-09-25 PROCEDURE — 1159F MED LIST DOCD IN RCRD: CPT | Performed by: NURSE PRACTITIONER

## 2024-09-25 PROCEDURE — 1160F RVW MEDS BY RX/DR IN RCRD: CPT | Performed by: NURSE PRACTITIONER

## 2024-09-25 RX ORDER — LOSARTAN POTASSIUM 100 MG/1
100 TABLET ORAL DAILY
Qty: 90 TABLET | Refills: 3 | Status: SHIPPED | OUTPATIENT
Start: 2024-09-25

## 2024-09-25 RX ORDER — APIXABAN 5 MG/1
5 TABLET, FILM COATED ORAL 2 TIMES DAILY
Qty: 180 TABLET | Refills: 1 | Status: SHIPPED | OUTPATIENT
Start: 2024-09-25

## 2024-09-25 RX ORDER — DILTIAZEM HYDROCHLORIDE 180 MG/1
180 CAPSULE, EXTENDED RELEASE ORAL DAILY
Qty: 90 CAPSULE | Refills: 3 | Status: SHIPPED | OUTPATIENT
Start: 2024-09-25

## 2024-09-25 RX ORDER — METOPROLOL SUCCINATE 50 MG/1
75 TABLET, EXTENDED RELEASE ORAL DAILY
Qty: 90 TABLET | Refills: 5 | Status: SHIPPED | OUTPATIENT
Start: 2024-09-25

## 2024-10-30 ENCOUNTER — PREP FOR SURGERY (OUTPATIENT)
Dept: OTHER | Facility: HOSPITAL | Age: 79
End: 2024-10-30
Payer: MEDICARE

## 2024-10-30 ENCOUNTER — OFFICE VISIT (OUTPATIENT)
Dept: CARDIOLOGY | Facility: CLINIC | Age: 79
End: 2024-10-30
Payer: MEDICARE

## 2024-10-30 VITALS — HEART RATE: 94 BPM | HEIGHT: 65 IN | WEIGHT: 194 LBS | BODY MASS INDEX: 32.32 KG/M2 | OXYGEN SATURATION: 93 %

## 2024-10-30 DIAGNOSIS — I10 ESSENTIAL HYPERTENSION: ICD-10-CM

## 2024-10-30 DIAGNOSIS — I48.19 PERSISTENT ATRIAL FIBRILLATION: Primary | ICD-10-CM

## 2024-10-30 DIAGNOSIS — I48.19 ATRIAL FIBRILLATION, PERSISTENT: Primary | ICD-10-CM

## 2024-10-30 NOTE — PROGRESS NOTES
HP      Name: Facundo Maguire ADMIT: (Not on file)   : 1945  PCP: Frank Padilla MD    MRN: [id number] LOS: 0 days   AGE/SEX: 79 y.o. female  ROOM: Room/bed info not found     Chief Complaint   Patient presents with    Consult     REF DARIN SCOTTChristie JOSUE       Subjective        History of present illness  Facundo Maguire is a 79-year-old female patient who has atrial fibrillation, nonobstructive CAD, hypertension, is here today to discuss about rhythm management options regarding her A-fib.    Past Medical History:   Diagnosis Date    Acute hypokalemia     Arthritis     Atrial fibrillation     Fibromyalgia     Hypertension     Palpitations      Past Surgical History:   Procedure Laterality Date    ABLATION OF DYSRHYTHMIC FOCUS      ANKLE SURGERY Left     Triple fracture    CARDIAC CATHETERIZATION      TUBAL ABDOMINAL LIGATION       Family History   Problem Relation Age of Onset    Thyroid disease Sister     Cancer Brother     Clotting disorder Neg Hx      Social History     Tobacco Use    Smoking status: Never     Passive exposure: Never    Smokeless tobacco: Never   Vaping Use    Vaping status: Never Used   Substance Use Topics    Alcohol use: No    Drug use: No     (Not in a hospital admission)    Allergies:  Cyclobenzaprine, Doxazosin mesylate, Lisinopril, Tetracycline, Valsartan, and Hydrochlorothiazide    Review of systems    Constitutional: Negative.    Respiratory and cardiovascular: As detailed in HPI section.  Gastrointestinal: Negative for constipation, nausea and vomiting negative for abdominal distention, abdominal pain and diarrhea.   Genitourinary: Negative for difficulty urinating and flank pain.   Musculoskeletal: Negative for arthralgias, joint swelling and myalgias.   Skin: Negative for color change, rash and wound.   Neurological: Negative for dizziness, syncope, weakness and headaches.   Hematological: Negative for adenopathy.   Psychiatric/Behavioral: Negative for confusion.    All other systems reviewed and are negative.    Physical Exam  VITALS REVIEWED    General:      well developed, in no acute distress.    Head:      normocephalic and atraumatic.    Eyes:      PERRL/EOM intact, conjunctiva and sclera clear with out nystagmus.    Neck:      no masses, thyromegaly,  trachea central with normal respiratory effort and PMI displaced laterally  Lungs:      Clear to auscultation bilaterally  Heart:       Irregular rhythm  Msk:      no deformity or scoliosis noted of thoracic or lumbar spine.    Pulses:      pulses normal in all 4 extremities.    Extremities:       No lower extremity edema  Neurologic:      no focal deficits.   alert oriented x3  Skin:      intact without lesions or rashes.    Psych:      alert and cooperative; normal mood and affect; normal attention span and concentration.      Result Review :               Pertinent cardiac workup    Heart cath 2019 mild to moderate lesion in LAD and RCA  EKGs from September 2019 until September 2024 all showing atrial fibrillation  Echo 4/20/2020 ejection fraction 50%      Procedures        Assessment and Plan      Facundo Maguire is a 79-year-old female patient who has nonobstructive CAD, has atrial fibrillation, is here today to discuss about rhythm management options regarding the A-fib.  Onset of A-fib is not known, however she had been in A-fib since at least September 2019 based on EKGs in the chart.  She has had JAVON guided cardioversion on 12/19/2020 but soon after she had reverted back into A-fib.  Patient also had cardiomyopathy in the past but her EF seems to have recovered.  Her heart rate currently is staying between 80 and 90 on average.  Previously she was on higher dose of metoprolol but had side effects from it and therefore she is taking 50 of Toprol and 180 of Cardizem.  She is also on Eliquis for stroke prevention without any bleeding issues to speak of.  The patient is interested in trying to restore normal  rhythm.  We went over certain options including repeat cardioversion with addition of antiarrhythmic versus ablation.  I did emphasize that in her case since the A-fib is longstanding, chances of restoring and maintaining sinus rhythm may not be as good.  Patient understands that but is willing to give it a try.  In my opinion cardioversion with antiarrhythmic may not be sufficient and therefore I think the best option is to go straight to the ablation.  In her case she will need PVI but most likely posterior wall isolation as well and probably CTI line as well.  After the ablation she might need antiarrhythmic therapy for a few months at least.  Risks indications and benefits for A-fib ablation were discussed at length with her and she is agreeable.    Diagnoses and all orders for this visit:    1. Atrial fibrillation, persistent (Primary)    2. Essential hypertension           No follow-ups on file.  Patient was given instructions and counseling regarding her condition or for health maintenance advice. Please see specific information pulled into the AVS if appropriate.         Electronically signed by Gee Barakat MD, 10/30/24, 2:51 PM EDT.

## 2024-10-31 ENCOUNTER — PATIENT ROUNDING (BHMG ONLY) (OUTPATIENT)
Dept: CARDIOLOGY | Facility: CLINIC | Age: 79
End: 2024-10-31
Payer: MEDICARE

## 2024-10-31 NOTE — PROGRESS NOTES
A My-Chart message has been sent to the patient for PATIENT ROUNDING with [facility]

## 2024-12-06 ENCOUNTER — ANESTHESIA EVENT (OUTPATIENT)
Dept: CARDIOLOGY | Facility: HOSPITAL | Age: 79
DRG: 274 | End: 2024-12-06
Payer: MEDICARE

## 2024-12-06 ENCOUNTER — ANESTHESIA (OUTPATIENT)
Dept: CARDIOLOGY | Facility: HOSPITAL | Age: 79
DRG: 274 | End: 2024-12-06
Payer: MEDICARE

## 2024-12-06 ENCOUNTER — HOSPITAL ENCOUNTER (INPATIENT)
Facility: HOSPITAL | Age: 79
LOS: 1 days | Discharge: HOME | DRG: 274 | End: 2024-12-07
Attending: INTERNAL MEDICINE | Admitting: INTERNAL MEDICINE
Payer: MEDICARE

## 2024-12-06 DIAGNOSIS — I48.19 PERSISTENT ATRIAL FIBRILLATION: ICD-10-CM

## 2024-12-06 LAB
ACT BLD: 245 SECONDS (ref 89–137)
ACT BLD: 268 SECONDS (ref 89–137)
ACT BLD: 268 SECONDS (ref 89–137)
ACT BLD: 285 SECONDS (ref 89–137)
ACT BLD: 314 SECONDS (ref 89–137)
ALBUMIN SERPL-MCNC: 4.3 G/DL (ref 3.5–5.2)
ALBUMIN/GLOB SERPL: 1.4 G/DL
ALP SERPL-CCNC: 95 U/L (ref 39–117)
ALT SERPL W P-5'-P-CCNC: 10 U/L (ref 1–33)
ANION GAP SERPL CALCULATED.3IONS-SCNC: 11.6 MMOL/L (ref 5–15)
AST SERPL-CCNC: 32 U/L (ref 1–32)
BASOPHILS # BLD AUTO: 0.07 10*3/MM3 (ref 0–0.2)
BASOPHILS NFR BLD AUTO: 1 % (ref 0–1.5)
BILIRUB SERPL-MCNC: 0.5 MG/DL (ref 0–1.2)
BUN SERPL-MCNC: 10 MG/DL (ref 8–23)
BUN/CREAT SERPL: 12 (ref 7–25)
CALCIUM SPEC-SCNC: 9.3 MG/DL (ref 8.6–10.5)
CHLORIDE SERPL-SCNC: 103 MMOL/L (ref 98–107)
CO2 SERPL-SCNC: 25.4 MMOL/L (ref 22–29)
CREAT SERPL-MCNC: 0.83 MG/DL (ref 0.57–1)
DEPRECATED RDW RBC AUTO: 44.4 FL (ref 37–54)
EGFRCR SERPLBLD CKD-EPI 2021: 72.7 ML/MIN/1.73
EOSINOPHIL # BLD AUTO: 0.23 10*3/MM3 (ref 0–0.4)
EOSINOPHIL NFR BLD AUTO: 3.2 % (ref 0.3–6.2)
ERYTHROCYTE [DISTWIDTH] IN BLOOD BY AUTOMATED COUNT: 13 % (ref 12.3–15.4)
GLOBULIN UR ELPH-MCNC: 3.1 GM/DL
GLUCOSE SERPL-MCNC: 110 MG/DL (ref 65–99)
HCT VFR BLD AUTO: 39.5 % (ref 34–46.6)
HGB BLD-MCNC: 12.6 G/DL (ref 12–15.9)
IMM GRANULOCYTES # BLD AUTO: 0.03 10*3/MM3 (ref 0–0.05)
IMM GRANULOCYTES NFR BLD AUTO: 0.4 % (ref 0–0.5)
INR PPP: 1.18 (ref 0.9–1.1)
LYMPHOCYTES # BLD AUTO: 1.1 10*3/MM3 (ref 0.7–3.1)
LYMPHOCYTES NFR BLD AUTO: 15.4 % (ref 19.6–45.3)
MAGNESIUM SERPL-MCNC: 1.9 MG/DL (ref 1.6–2.4)
MCH RBC QN AUTO: 29.9 PG (ref 26.6–33)
MCHC RBC AUTO-ENTMCNC: 31.9 G/DL (ref 31.5–35.7)
MCV RBC AUTO: 93.6 FL (ref 79–97)
MONOCYTES # BLD AUTO: 0.48 10*3/MM3 (ref 0.1–0.9)
MONOCYTES NFR BLD AUTO: 6.7 % (ref 5–12)
NEUTROPHILS NFR BLD AUTO: 5.21 10*3/MM3 (ref 1.7–7)
NEUTROPHILS NFR BLD AUTO: 73.3 % (ref 42.7–76)
NRBC BLD AUTO-RTO: 0 /100 WBC (ref 0–0.2)
PLATELET # BLD AUTO: 226 10*3/MM3 (ref 140–450)
PMV BLD AUTO: 10.3 FL (ref 6–12)
POTASSIUM SERPL-SCNC: 3.9 MMOL/L (ref 3.5–5.2)
PROT SERPL-MCNC: 7.4 G/DL (ref 6–8.5)
PROTHROMBIN TIME: 15.1 SECONDS (ref 11.7–14.2)
RBC # BLD AUTO: 4.22 10*6/MM3 (ref 3.77–5.28)
SODIUM SERPL-SCNC: 140 MMOL/L (ref 136–145)
WBC NRBC COR # BLD AUTO: 7.12 10*3/MM3 (ref 3.4–10.8)

## 2024-12-06 PROCEDURE — 93655 ICAR CATH ABLTJ DSCRT ARRHYT: CPT | Performed by: INTERNAL MEDICINE

## 2024-12-06 PROCEDURE — 25010000002 PROTAMINE SULFATE PER 10 MG: Performed by: NURSE ANESTHETIST, CERTIFIED REGISTERED

## 2024-12-06 PROCEDURE — S0260 H&P FOR SURGERY: HCPCS | Performed by: INTERNAL MEDICINE

## 2024-12-06 PROCEDURE — 25810000003 SODIUM CHLORIDE 0.9 % SOLUTION: Performed by: NURSE ANESTHETIST, CERTIFIED REGISTERED

## 2024-12-06 PROCEDURE — 93657 TX L/R ATRIAL FIB ADDL: CPT | Performed by: INTERNAL MEDICINE

## 2024-12-06 PROCEDURE — 25810000003 SODIUM CHLORIDE 0.9 % SOLUTION 250 ML FLEX CONT: Performed by: NURSE ANESTHETIST, CERTIFIED REGISTERED

## 2024-12-06 PROCEDURE — C1732 CATH, EP, DIAG/ABL, 3D/VECT: HCPCS | Performed by: INTERNAL MEDICINE

## 2024-12-06 PROCEDURE — 85347 COAGULATION TIME ACTIVATED: CPT

## 2024-12-06 PROCEDURE — C1766 INTRO/SHEATH,STRBLE,NON-PEEL: HCPCS | Performed by: INTERNAL MEDICINE

## 2024-12-06 PROCEDURE — C1894 INTRO/SHEATH, NON-LASER: HCPCS | Performed by: INTERNAL MEDICINE

## 2024-12-06 PROCEDURE — 25010000002 HEPARIN (PORCINE) PER 1000 UNITS: Performed by: NURSE ANESTHETIST, CERTIFIED REGISTERED

## 2024-12-06 PROCEDURE — 25010000002 DEXAMETHASONE PER 1 MG: Performed by: NURSE ANESTHETIST, CERTIFIED REGISTERED

## 2024-12-06 PROCEDURE — 93656 COMPRE EP EVAL ABLTJ ATR FIB: CPT | Performed by: INTERNAL MEDICINE

## 2024-12-06 PROCEDURE — 85610 PROTHROMBIN TIME: CPT | Performed by: INTERNAL MEDICINE

## 2024-12-06 PROCEDURE — 80053 COMPREHEN METABOLIC PANEL: CPT | Performed by: INTERNAL MEDICINE

## 2024-12-06 PROCEDURE — 25010000002 FENTANYL CITRATE (PF) 100 MCG/2ML SOLUTION: Performed by: NURSE ANESTHETIST, CERTIFIED REGISTERED

## 2024-12-06 PROCEDURE — 02K83ZZ MAP CONDUCTION MECHANISM, PERCUTANEOUS APPROACH: ICD-10-PCS | Performed by: INTERNAL MEDICINE

## 2024-12-06 PROCEDURE — 25010000002 HEPARIN (PORCINE) 25000-0.45 UT/250ML-% SOLUTION: Performed by: NURSE ANESTHETIST, CERTIFIED REGISTERED

## 2024-12-06 PROCEDURE — 25010000002 ONDANSETRON PER 1 MG: Performed by: NURSE ANESTHETIST, CERTIFIED REGISTERED

## 2024-12-06 PROCEDURE — 85025 COMPLETE CBC W/AUTO DIFF WBC: CPT | Performed by: INTERNAL MEDICINE

## 2024-12-06 PROCEDURE — C1759 CATH, INTRA ECHOCARDIOGRAPHY: HCPCS | Performed by: INTERNAL MEDICINE

## 2024-12-06 PROCEDURE — 4A0234Z MEASUREMENT OF CARDIAC ELECTRICAL ACTIVITY, PERCUTANEOUS APPROACH: ICD-10-PCS | Performed by: INTERNAL MEDICINE

## 2024-12-06 PROCEDURE — 83735 ASSAY OF MAGNESIUM: CPT | Performed by: INTERNAL MEDICINE

## 2024-12-06 PROCEDURE — 25010000002 GLYCOPYRROLATE 1 MG/5ML SOLUTION: Performed by: NURSE ANESTHETIST, CERTIFIED REGISTERED

## 2024-12-06 PROCEDURE — 02583ZF DESTRUCTION OF CONDUCTION MECHANISM USING IRREVERSIBLE ELECTROPORATION, PERCUTANEOUS APPROACH: ICD-10-PCS | Performed by: INTERNAL MEDICINE

## 2024-12-06 PROCEDURE — C1730 CATH, EP, 19 OR FEW ELECT: HCPCS | Performed by: INTERNAL MEDICINE

## 2024-12-06 PROCEDURE — 25010000002 PHENYLEPHRINE 10 MG/ML SOLUTION 5 ML VIAL: Performed by: NURSE ANESTHETIST, CERTIFIED REGISTERED

## 2024-12-06 PROCEDURE — 25010000002 SUCCINYLCHOLINE PER 20 MG: Performed by: NURSE ANESTHETIST, CERTIFIED REGISTERED

## 2024-12-06 PROCEDURE — 25010000002 PROPOFOL 200 MG/20ML EMULSION: Performed by: NURSE ANESTHETIST, CERTIFIED REGISTERED

## 2024-12-06 PROCEDURE — C1733 CATH, EP, OTHR THAN COOL-TIP: HCPCS | Performed by: INTERNAL MEDICINE

## 2024-12-06 RX ORDER — EPHEDRINE SULFATE 5 MG/ML
5 INJECTION INTRAVENOUS ONCE AS NEEDED
Status: DISCONTINUED | OUTPATIENT
Start: 2024-12-06 | End: 2024-12-06

## 2024-12-06 RX ORDER — IPRATROPIUM BROMIDE AND ALBUTEROL SULFATE 2.5; .5 MG/3ML; MG/3ML
3 SOLUTION RESPIRATORY (INHALATION) ONCE AS NEEDED
Status: DISCONTINUED | OUTPATIENT
Start: 2024-12-06 | End: 2024-12-06

## 2024-12-06 RX ORDER — HEPARIN SODIUM 1000 [USP'U]/ML
INJECTION, SOLUTION INTRAVENOUS; SUBCUTANEOUS AS NEEDED
Status: DISCONTINUED | OUTPATIENT
Start: 2024-12-06 | End: 2024-12-06 | Stop reason: SURG

## 2024-12-06 RX ORDER — NALOXONE HCL 0.4 MG/ML
0.4 VIAL (ML) INJECTION AS NEEDED
Status: DISCONTINUED | OUTPATIENT
Start: 2024-12-06 | End: 2024-12-06

## 2024-12-06 RX ORDER — ROCURONIUM BROMIDE 10 MG/ML
INJECTION, SOLUTION INTRAVENOUS AS NEEDED
Status: DISCONTINUED | OUTPATIENT
Start: 2024-12-06 | End: 2024-12-06 | Stop reason: SURG

## 2024-12-06 RX ORDER — SUCCINYLCHOLINE CHLORIDE 20 MG/ML
INJECTION INTRAMUSCULAR; INTRAVENOUS AS NEEDED
Status: DISCONTINUED | OUTPATIENT
Start: 2024-12-06 | End: 2024-12-06 | Stop reason: SURG

## 2024-12-06 RX ORDER — HYDRALAZINE HYDROCHLORIDE 20 MG/ML
5 INJECTION INTRAMUSCULAR; INTRAVENOUS
Status: DISCONTINUED | OUTPATIENT
Start: 2024-12-06 | End: 2024-12-06

## 2024-12-06 RX ORDER — PANTOPRAZOLE SODIUM 40 MG/1
40 TABLET, DELAYED RELEASE ORAL
Status: DISCONTINUED | OUTPATIENT
Start: 2024-12-07 | End: 2024-12-06

## 2024-12-06 RX ORDER — LABETALOL HYDROCHLORIDE 5 MG/ML
5 INJECTION, SOLUTION INTRAVENOUS
Status: DISCONTINUED | OUTPATIENT
Start: 2024-12-06 | End: 2024-12-06

## 2024-12-06 RX ORDER — NITROGLYCERIN 0.4 MG/1
0.4 TABLET SUBLINGUAL
Status: DISCONTINUED | OUTPATIENT
Start: 2024-12-06 | End: 2024-12-07 | Stop reason: HOSPADM

## 2024-12-06 RX ORDER — FLUMAZENIL 0.1 MG/ML
0.2 INJECTION INTRAVENOUS AS NEEDED
Status: DISCONTINUED | OUTPATIENT
Start: 2024-12-06 | End: 2024-12-06

## 2024-12-06 RX ORDER — LOSARTAN POTASSIUM 50 MG/1
100 TABLET ORAL DAILY
Status: DISCONTINUED | OUTPATIENT
Start: 2024-12-06 | End: 2024-12-07 | Stop reason: HOSPADM

## 2024-12-06 RX ORDER — FENTANYL CITRATE 50 UG/ML
INJECTION, SOLUTION INTRAMUSCULAR; INTRAVENOUS AS NEEDED
Status: DISCONTINUED | OUTPATIENT
Start: 2024-12-06 | End: 2024-12-06 | Stop reason: SURG

## 2024-12-06 RX ORDER — AMIODARONE HYDROCHLORIDE 200 MG/1
200 TABLET ORAL EVERY 12 HOURS SCHEDULED
Status: DISCONTINUED | OUTPATIENT
Start: 2024-12-06 | End: 2024-12-07 | Stop reason: HOSPADM

## 2024-12-06 RX ORDER — DEXAMETHASONE SODIUM PHOSPHATE 4 MG/ML
INJECTION, SOLUTION INTRA-ARTICULAR; INTRALESIONAL; INTRAMUSCULAR; INTRAVENOUS; SOFT TISSUE AS NEEDED
Status: DISCONTINUED | OUTPATIENT
Start: 2024-12-06 | End: 2024-12-06 | Stop reason: SURG

## 2024-12-06 RX ORDER — GLYCOPYRROLATE 0.2 MG/ML
INJECTION INTRAMUSCULAR; INTRAVENOUS AS NEEDED
Status: DISCONTINUED | OUTPATIENT
Start: 2024-12-06 | End: 2024-12-06 | Stop reason: SURG

## 2024-12-06 RX ORDER — DIPHENHYDRAMINE HYDROCHLORIDE 50 MG/ML
12.5 INJECTION INTRAMUSCULAR; INTRAVENOUS
Status: DISCONTINUED | OUTPATIENT
Start: 2024-12-06 | End: 2024-12-06

## 2024-12-06 RX ORDER — HEPARIN SODIUM 10000 [USP'U]/100ML
INJECTION, SOLUTION INTRAVENOUS CONTINUOUS PRN
Status: DISCONTINUED | OUTPATIENT
Start: 2024-12-06 | End: 2024-12-06 | Stop reason: SURG

## 2024-12-06 RX ORDER — ONDANSETRON 2 MG/ML
4 INJECTION INTRAMUSCULAR; INTRAVENOUS ONCE AS NEEDED
Status: DISCONTINUED | OUTPATIENT
Start: 2024-12-06 | End: 2024-12-06

## 2024-12-06 RX ORDER — ONDANSETRON 2 MG/ML
INJECTION INTRAMUSCULAR; INTRAVENOUS AS NEEDED
Status: DISCONTINUED | OUTPATIENT
Start: 2024-12-06 | End: 2024-12-06 | Stop reason: SURG

## 2024-12-06 RX ORDER — DIPHENHYDRAMINE HYDROCHLORIDE 50 MG/ML
12.5 INJECTION INTRAMUSCULAR; INTRAVENOUS ONCE AS NEEDED
Status: DISCONTINUED | OUTPATIENT
Start: 2024-12-06 | End: 2024-12-06

## 2024-12-06 RX ORDER — SODIUM CHLORIDE 9 MG/ML
INJECTION, SOLUTION INTRAVENOUS CONTINUOUS PRN
Status: DISCONTINUED | OUTPATIENT
Start: 2024-12-06 | End: 2024-12-06 | Stop reason: SURG

## 2024-12-06 RX ORDER — OXYCODONE HYDROCHLORIDE 5 MG/1
10 TABLET ORAL EVERY 4 HOURS PRN
Status: DISCONTINUED | OUTPATIENT
Start: 2024-12-06 | End: 2024-12-06

## 2024-12-06 RX ORDER — PROPOFOL 10 MG/ML
INJECTION, EMULSION INTRAVENOUS AS NEEDED
Status: DISCONTINUED | OUTPATIENT
Start: 2024-12-06 | End: 2024-12-06 | Stop reason: SURG

## 2024-12-06 RX ORDER — OXYCODONE HYDROCHLORIDE 5 MG/1
5 TABLET ORAL ONCE AS NEEDED
Status: DISCONTINUED | OUTPATIENT
Start: 2024-12-06 | End: 2024-12-06

## 2024-12-06 RX ORDER — PANTOPRAZOLE SODIUM 40 MG/1
40 TABLET, DELAYED RELEASE ORAL
Status: DISCONTINUED | OUTPATIENT
Start: 2024-12-06 | End: 2024-12-07 | Stop reason: HOSPADM

## 2024-12-06 RX ORDER — PROTAMINE SULFATE 10 MG/ML
INJECTION, SOLUTION INTRAVENOUS AS NEEDED
Status: DISCONTINUED | OUTPATIENT
Start: 2024-12-06 | End: 2024-12-06 | Stop reason: SURG

## 2024-12-06 RX ADMIN — PROPOFOL 150 MG: 10 INJECTION, EMULSION INTRAVENOUS at 11:56

## 2024-12-06 RX ADMIN — SODIUM CHLORIDE: 9 INJECTION, SOLUTION INTRAVENOUS at 11:46

## 2024-12-06 RX ADMIN — HEPARIN SODIUM 3000 UNITS: 1000 INJECTION INTRAVENOUS; SUBCUTANEOUS at 12:43

## 2024-12-06 RX ADMIN — SUCCINYLCHOLINE CHLORIDE 160 MG: 20 INJECTION, SOLUTION INTRAMUSCULAR; INTRAVENOUS at 11:56

## 2024-12-06 RX ADMIN — ROCURONIUM BROMIDE 10 MG: 10 INJECTION, SOLUTION INTRAVENOUS at 14:05

## 2024-12-06 RX ADMIN — ROCURONIUM BROMIDE 10 MG: 10 INJECTION, SOLUTION INTRAVENOUS at 11:56

## 2024-12-06 RX ADMIN — HEPARIN SODIUM 2000 UNITS: 1000 INJECTION INTRAVENOUS; SUBCUTANEOUS at 12:56

## 2024-12-06 RX ADMIN — HEPARIN SODIUM 18 UNITS/KG/HR: 10000 INJECTION, SOLUTION INTRAVENOUS at 12:25

## 2024-12-06 RX ADMIN — ROCURONIUM BROMIDE 40 MG: 10 INJECTION, SOLUTION INTRAVENOUS at 12:09

## 2024-12-06 RX ADMIN — LOSARTAN POTASSIUM 100 MG: 50 TABLET, FILM COATED ORAL at 19:54

## 2024-12-06 RX ADMIN — FENTANYL CITRATE 50 MCG: 50 INJECTION, SOLUTION INTRAMUSCULAR; INTRAVENOUS at 13:19

## 2024-12-06 RX ADMIN — FENTANYL CITRATE 50 MCG: 50 INJECTION, SOLUTION INTRAMUSCULAR; INTRAVENOUS at 11:56

## 2024-12-06 RX ADMIN — ONDANSETRON 4 MG: 2 INJECTION, SOLUTION INTRAMUSCULAR; INTRAVENOUS at 14:11

## 2024-12-06 RX ADMIN — DEXAMETHASONE SODIUM PHOSPHATE 8 MG: 4 INJECTION, SOLUTION INTRAMUSCULAR; INTRAVENOUS at 12:17

## 2024-12-06 RX ADMIN — PANTOPRAZOLE SODIUM 40 MG: 40 TABLET, DELAYED RELEASE ORAL at 18:13

## 2024-12-06 RX ADMIN — PHENYLEPHRINE HYDROCHLORIDE 0.5 MCG/KG/MIN: 10 INJECTION INTRAVENOUS at 12:16

## 2024-12-06 RX ADMIN — PROTAMINE SULFATE 50 MG: 10 INJECTION, SOLUTION INTRAVENOUS at 14:09

## 2024-12-06 RX ADMIN — ROCURONIUM BROMIDE 20 MG: 10 INJECTION, SOLUTION INTRAVENOUS at 12:52

## 2024-12-06 RX ADMIN — HEPARIN SODIUM 9680 UNITS: 1000 INJECTION INTRAVENOUS; SUBCUTANEOUS at 12:24

## 2024-12-06 RX ADMIN — GLYCOPYRROLATE 0.2 MCG: 0.2 INJECTION INTRAMUSCULAR; INTRAVENOUS at 12:04

## 2024-12-06 RX ADMIN — AMIODARONE HYDROCHLORIDE 200 MG: 200 TABLET ORAL at 20:00

## 2024-12-06 RX ADMIN — HEPARIN SODIUM 3000 UNITS: 1000 INJECTION INTRAVENOUS; SUBCUTANEOUS at 13:13

## 2024-12-06 NOTE — H&P
History of present illness  Facundo Maguire is a 79-year-old female patient who has atrial fibrillation, nonobstructive CAD, hypertension, is here today to discuss about rhythm management options regarding her A-fib.     Medical History        Past Medical History:   Diagnosis Date    Acute hypokalemia      Arthritis      Atrial fibrillation      Fibromyalgia      Hypertension      Palpitations           Surgical History         Past Surgical History:   Procedure Laterality Date    ABLATION OF DYSRHYTHMIC FOCUS        ANKLE SURGERY Left       Triple fracture    CARDIAC CATHETERIZATION        TUBAL ABDOMINAL LIGATION                   Family History   Problem Relation Age of Onset    Thyroid disease Sister      Cancer Brother      Clotting disorder Neg Hx        Social History   Social History            Tobacco Use    Smoking status: Never       Passive exposure: Never    Smokeless tobacco: Never   Vaping Use    Vaping status: Never Used   Substance Use Topics    Alcohol use: No    Drug use: No           Prescriptions Prior to Admission   (Not in a hospital admission)      Allergies:  Cyclobenzaprine, Doxazosin mesylate, Lisinopril, Tetracycline, Valsartan, and Hydrochlorothiazide     Review of systems     Constitutional: Negative.    Respiratory and cardiovascular: As detailed in HPI section.  Gastrointestinal: Negative for constipation, nausea and vomiting negative for abdominal distention, abdominal pain and diarrhea.   Genitourinary: Negative for difficulty urinating and flank pain.   Musculoskeletal: Negative for arthralgias, joint swelling and myalgias.   Skin: Negative for color change, rash and wound.   Neurological: Negative for dizziness, syncope, weakness and headaches.   Hematological: Negative for adenopathy.   Psychiatric/Behavioral: Negative for confusion.   All other systems reviewed and are negative.     Physical Exam  VITALS REVIEWED     General:      well developed, in no acute distress.     Head:      normocephalic and atraumatic.    Eyes:      PERRL/EOM intact, conjunctiva and sclera clear with out nystagmus.    Neck:      no masses, thyromegaly,  trachea central with normal respiratory effort and PMI displaced laterally  Lungs:      Clear to auscultation bilaterally  Heart:       Irregular rhythm  Msk:      no deformity or scoliosis noted of thoracic or lumbar spine.    Pulses:      pulses normal in all 4 extremities.    Extremities:       No lower extremity edema  Neurologic:      no focal deficits.   alert oriented x3  Skin:      intact without lesions or rashes.    Psych:      alert and cooperative; normal mood and affect; normal attention span and concentration.          Result Review  :                    Pertinent cardiac workup     Heart cath 2019 mild to moderate lesion in LAD and RCA  EKGs from September 2019 until September 2024 all showing atrial fibrillation  Echo 4/20/2020 ejection fraction 50%        Procedures         Assessment  Assessment and Plan       Facundo Maguire is a 79-year-old female patient who has nonobstructive CAD, has atrial fibrillation, is here today to discuss about rhythm management options regarding the A-fib.  Onset of A-fib is not known, however she had been in A-fib since at least September 2019 based on EKGs in the chart.  She has had JAVON guided cardioversion on 12/19/2020 but soon after she had reverted back into A-fib.  Patient also had cardiomyopathy in the past but her EF seems to have recovered.  Her heart rate currently is staying between 80 and 90 on average.  Previously she was on higher dose of metoprolol but had side effects from it and therefore she is taking 50 of Toprol and 180 of Cardizem.  She is also on Eliquis for stroke prevention without any bleeding issues to speak of.  The patient is interested in trying to restore normal rhythm.  We went over certain options including repeat cardioversion with addition of antiarrhythmic versus  ablation.  I did emphasize that in her case since the A-fib is longstanding, chances of restoring and maintaining sinus rhythm may not be as good.  Patient understands that but is willing to give it a try.  In my opinion cardioversion with antiarrhythmic may not be sufficient and therefore I think the best option is to go straight to the ablation.  In her case she will need PVI but most likely posterior wall isolation as well and probably CTI line as well.  After the ablation she might need antiarrhythmic therapy for a few months at least.  Risks indications and benefits for A-fib ablation were discussed at length with her and she is agreeable.     Diagnoses and all orders for this visit:     1. Atrial fibrillation, persistent (Primary)     2. Essential hypertension

## 2024-12-06 NOTE — ANESTHESIA PREPROCEDURE EVALUATION
Anesthesia Evaluation     Patient summary reviewed   no history of anesthetic complications:   NPO Solid Status: > 8 hours             Airway   Mallampati: II  TM distance: >3 FB  Neck ROM: full  Dental - normal exam     Pulmonary - normal exam   (-) not a smoker  Cardiovascular - normal exam    ECG reviewed    (+) hypertension, CAD, dysrhythmias Atrial Fib, CHF , hyperlipidemia      Neuro/Psych  (-) CVA  GI/Hepatic/Renal/Endo      Musculoskeletal     Abdominal    Substance History      OB/GYN          Other   arthritis,     ROS/Med Hx Other: · Left ventricular wall thickness is consistent with mild concentric hypertrophy.  · Estimated EF = 50%.  · Left ventricular systolic function is normal.  · Right ventricular cavity is mildly dilated.  · Left atrial cavity size is moderately dilated.  · Mild mitral valve regurgitation is present  · Mild tricuspid valve regurgitation is present.                  Anesthesia Plan    ASA 3     general   total IV anesthesia  intravenous induction     Anesthetic plan, risks, benefits, and alternatives have been provided, discussed and informed consent has been obtained with: patient.    Plan discussed with CRNA.    CODE STATUS:

## 2024-12-06 NOTE — ANESTHESIA PROCEDURE NOTES
Airway  Urgency: elective    Date/Time: 12/6/2024 12:00 PM    General Information and Staff    Patient location during procedure: OR  CRNA/CAA: Michell Gutierrez CRNA    Indications and Patient Condition  Indications for airway management: airway protection    Preoxygenated: yes  Mask difficulty assessment: 2 - vent by mask + OA or adjuvant +/- NMBA    Final Airway Details  Final airway type: endotracheal airway      Successful airway: ETT  Cuffed: yes   Successful intubation technique: video laryngoscopy  Facilitating devices/methods: intubating stylet  Endotracheal tube insertion site: oral  Blade: Patton  Blade size: 3  ETT size (mm): 7.0  Cormack-Lehane Classification: grade I - full view of glottis  Placement verified by: chest auscultation and capnometry   Measured from: lips  ETT/EBT  to lips (cm): 21  Number of attempts at approach: 1  Assessment: lips, teeth, and gum same as pre-op and atraumatic intubation

## 2024-12-07 ENCOUNTER — READMISSION MANAGEMENT (OUTPATIENT)
Dept: CALL CENTER | Facility: HOSPITAL | Age: 79
End: 2024-12-07
Payer: MEDICARE

## 2024-12-07 VITALS
OXYGEN SATURATION: 95 % | HEART RATE: 72 BPM | HEIGHT: 63 IN | DIASTOLIC BLOOD PRESSURE: 66 MMHG | BODY MASS INDEX: 35.47 KG/M2 | RESPIRATION RATE: 20 BRPM | TEMPERATURE: 98 F | SYSTOLIC BLOOD PRESSURE: 112 MMHG | WEIGHT: 200.18 LBS

## 2024-12-07 PROBLEM — I48.19 PERSISTENT ATRIAL FIBRILLATION: Chronic | Status: ACTIVE | Noted: 2024-10-30

## 2024-12-07 PROCEDURE — 25010000002 HYDRALAZINE PER 20 MG: Performed by: INTERNAL MEDICINE

## 2024-12-07 PROCEDURE — 25010000002 LABETALOL 5 MG/ML SOLUTION: Performed by: INTERNAL MEDICINE

## 2024-12-07 PROCEDURE — 99238 HOSP IP/OBS DSCHRG MGMT 30/<: CPT | Performed by: INTERNAL MEDICINE

## 2024-12-07 RX ORDER — HYDRALAZINE HYDROCHLORIDE 20 MG/ML
10 INJECTION INTRAMUSCULAR; INTRAVENOUS EVERY 4 HOURS PRN
Status: DISCONTINUED | OUTPATIENT
Start: 2024-12-07 | End: 2024-12-07 | Stop reason: HOSPADM

## 2024-12-07 RX ORDER — LABETALOL HYDROCHLORIDE 5 MG/ML
10 INJECTION, SOLUTION INTRAVENOUS ONCE
Status: COMPLETED | OUTPATIENT
Start: 2024-12-07 | End: 2024-12-07

## 2024-12-07 RX ADMIN — HYDRALAZINE HYDROCHLORIDE 10 MG: 20 INJECTION INTRAMUSCULAR; INTRAVENOUS at 01:21

## 2024-12-07 RX ADMIN — LOSARTAN POTASSIUM 100 MG: 50 TABLET, FILM COATED ORAL at 08:05

## 2024-12-07 RX ADMIN — PANTOPRAZOLE SODIUM 40 MG: 40 TABLET, DELAYED RELEASE ORAL at 08:05

## 2024-12-07 RX ADMIN — LABETALOL HYDROCHLORIDE 10 MG: 5 INJECTION, SOLUTION INTRAVENOUS at 02:33

## 2024-12-07 RX ADMIN — METOPROLOL SUCCINATE 75 MG: 25 TABLET, EXTENDED RELEASE ORAL at 08:05

## 2024-12-07 RX ADMIN — AMIODARONE HYDROCHLORIDE 200 MG: 200 TABLET ORAL at 08:05

## 2024-12-07 NOTE — DISCHARGE SUMMARY
Date of Discharge:  12/7/2024    Discharge Diagnosis:   Active Hospital Problems    Diagnosis  POA    **Persistent atrial fibrillation [I48.19]  Unknown    Atrial fibrillation, persistent [I48.19]  Yes      Resolved Hospital Problems   No resolved problems to display.       Presenting Problem/History of Present Illness  Persistent atrial fibrillation [I48.19]  Atrial fibrillation, persistent [I48.19]      Hospital Course    Facundo Maguire is a 79-year-old female patient who has nonobstructive CAD, has atrial fibrillation, is here today to discuss about rhythm management options regarding the A-fib.  Onset of A-fib is not known, however she had been in A-fib since at least September 2019 based on EKGs in the chart.  She has had JAVON guided cardioversion on 12/19/2020 but soon after she had reverted back into A-fib.  Patient also had cardiomyopathy in the past but her EF seems to have recovered.  Her heart rate currently is staying between 80 and 90 on average.  Previously she was on higher dose of metoprolol but had side effects from it and therefore she is taking 50 of Toprol and 180 of Cardizem.  She is also on Eliquis for stroke prevention without any bleeding issues to speak of.  The patient is interested in trying to restore normal rhythm.  We went over certain options including repeat cardioversion with addition of antiarrhythmic versus ablation.  I did emphasize that in her case since the A-fib is longstanding, chances of restoring and maintaining sinus rhythm may not be as good.  Patient understands that but is willing to give it a try.  In my opinion cardioversion with antiarrhythmic may not be sufficient and therefore I think the best option is to go straight to the ablation.  In her case she will need PVI but most likely posterior wall isolation as well and probably CTI line as well.  After the ablation she might need antiarrhythmic therapy for a few months at least.  Risks indications and benefits  "for A-fib ablation were discussed at length with her and she is agreeable.     Diagnoses and all orders for this visit:     1. Atrial fibrillation, persistent (Primary)     2. Essential hypertension     Patient with A-fib was brought in for ablation by EP Dr. Veronica Barakat.  Was monitored overnight is being discharged home to follow-up closely with EP.  Will continue diltiazem, metoprolol, Eliquis, losartan as tolerated    Procedures Performed  Procedure(s):  Ablation atrial fibrillation, PFA - REPS EMAILED       Consults:   Consults       No orders found for last 30 day(s).                Ejection Fraction  No results found for: \"EF\"    Echo EF Estimated  Lab Results   Component Value Date    ECHOEFEST 50 04/17/2020       Nuclear Stress Ejection Fraction  No components found for: \"NUCEF\"    Cath Ejection Fraction Quantitative  No results found for: \"CATHEF\"    Condition on Discharge: Stable    Physical Exam at Discharge :    Vital Signs  Temp:  [97.5 °F (36.4 °C)-98.4 °F (36.9 °C)] 97.7 °F (36.5 °C)  Heart Rate:  [] 105  Resp:  [11-25] 13  BP: (124-169)/(66-97) 169/90  General:  Appears in no acute distress  Eyes: Sclera is anicteric,  conjunctiva is clear   HEENT:  No JVD. Thyroid not visibly enlarged. No mucosal pallor or cyanosis  Respiratory: Respirations regular and unlabored at rest.  CTA  Cardiovascular: S1,S2 Regular rate and rhythm.   Gastrointestinal: Abdomen nondistended, soft  Musculoskeletal:  No abnormal movements  Extremities: No digital clubbing or cyanosis  Skin: Color pink. Skin warm and dry to touch. No rashes  No xanthoma  Neuro: Alert and awake, no lateralizing deficits appreciated        Discharge Disposition Home      Discharge Medications     Discharge Medications        ASK your doctor about these medications        Instructions Start Date   dilTIAZem  MG 24 hr capsule  Commonly known as: DILACOR XR   180 mg, Oral, Daily      Eliquis 5 MG tablet tablet  Generic drug: " apixaban   5 mg, Oral, 2 Times Daily      lansoprazole 30 MG capsule  Commonly known as: PREVACID   30 mg, Daily      losartan 100 MG tablet  Commonly known as: COZAAR   100 mg, Oral, Daily      metoprolol succinate XL 50 MG 24 hr tablet  Commonly known as: TOPROL-XL   75 mg, Oral, Daily               Discharge Diet: Cardiac diet    Activity at Discharge: Activity as tolerated    Follow-up Appointments: Follow-up with me in 1 month  Future Appointments   Date Time Provider Department Center   1/8/2025  2:45 PM Gee Barakat MD [facility]   3/12/2025  9:00 AM Jennifer Gabriel APRN [facility]              Colby Loredo MD  12/07/24  02:39 EST    Time: Discharge Time Spent:30

## 2024-12-07 NOTE — ANESTHESIA POSTPROCEDURE EVALUATION
Patient: Facundo CATALAN Andrez    Procedure Summary       Date: 12/06/24 Room / Location: [place] CATH LAB 3 / [facility] CATH INVASIVE LOCATION    Anesthesia Start: 1146 Anesthesia Stop: 1433    Procedure: Ablation atrial fibrillation, PFA - REPS EMAILED Diagnosis:       Persistent atrial fibrillation      (afib)    Providers: Gee Barakat MD Provider: Amanda Kelly MD    Anesthesia Type: general ASA Status: 3            Anesthesia Type: general    Vitals  Vitals Value Taken Time   /89 12/06/24 1515   Temp 97.6 °F (36.4 °C) 12/06/24 1515   Pulse 75 12/06/24 1515   Resp 11 12/06/24 1515   SpO2 94 % 12/06/24 1515           Post Anesthesia Care and Evaluation    Patient location during evaluation: PACU  Patient participation: complete - patient participated  Level of consciousness: awake  Pain scale: See nurse's notes for pain score.  Pain management: adequate    Airway patency: patent  Anesthetic complications: No anesthetic complications  PONV Status: none  Cardiovascular status: acceptable  Respiratory status: acceptable and spontaneous ventilation  Hydration status: acceptable    Comments: Patient seen and examined postoperatively; vital signs stable; SpO2 greater than or equal to 90%; cardiopulmonary status stable; nausea/vomiting adequately controlled; pain adequately controlled; no apparent anesthesia complications; patient discharged from anesthesia care when discharge criteria were met

## 2024-12-07 NOTE — OUTREACH NOTE
Prep Survey      Flowsheet Row Responses   [facility] discharged from? [facility]   Is LACE score < 7 ? No   Eligibility Readm Mgmt   Discharge diagnosis Persistent atrial fibrillation   Does the patient have one of the following disease processes/diagnoses(primary or secondary)? Other   Prep survey completed? Yes            Stephanie ANGELA - Registered Nurse

## 2024-12-08 ENCOUNTER — READMISSION MANAGEMENT (OUTPATIENT)
Dept: CALL CENTER | Facility: HOSPITAL | Age: 79
End: 2024-12-08
Payer: MEDICARE

## 2024-12-08 ENCOUNTER — NURSE TRIAGE (OUTPATIENT)
Dept: CALL CENTER | Facility: HOSPITAL | Age: 79
End: 2024-12-08
Payer: MEDICARE

## 2024-12-08 ENCOUNTER — HOSPITAL ENCOUNTER (OUTPATIENT)
Facility: HOSPITAL | Age: 79
Setting detail: OBSERVATION
Discharge: HOME | End: 2024-12-09
Attending: EMERGENCY MEDICINE | Admitting: INTERNAL MEDICINE
Payer: MEDICARE

## 2024-12-08 ENCOUNTER — APPOINTMENT (OUTPATIENT)
Dept: GENERAL RADIOLOGY | Facility: HOSPITAL | Age: 79
End: 2024-12-08
Payer: MEDICARE

## 2024-12-08 DIAGNOSIS — R07.9 CHEST PAIN, UNSPECIFIED TYPE: Primary | ICD-10-CM

## 2024-12-08 PROBLEM — R07.89 ATYPICAL CHEST PAIN: Status: ACTIVE | Noted: 2024-12-08

## 2024-12-08 LAB
ALBUMIN SERPL-MCNC: 4.3 G/DL (ref 3.5–5.2)
ALBUMIN/GLOB SERPL: 1.4 G/DL
ALP SERPL-CCNC: 89 U/L (ref 39–117)
ALT SERPL W P-5'-P-CCNC: 21 U/L (ref 1–33)
ANION GAP SERPL CALCULATED.3IONS-SCNC: 13.3 MMOL/L (ref 5–15)
APTT PPP: 26.7 SECONDS (ref 22.7–35.4)
AST SERPL-CCNC: 48 U/L (ref 1–32)
BASOPHILS # BLD AUTO: 0.06 10*3/MM3 (ref 0–0.2)
BASOPHILS NFR BLD AUTO: 0.5 % (ref 0–1.5)
BILIRUB SERPL-MCNC: 0.8 MG/DL (ref 0–1.2)
BUN SERPL-MCNC: 19 MG/DL (ref 8–23)
BUN/CREAT SERPL: 17.4 (ref 7–25)
CALCIUM SPEC-SCNC: 9.4 MG/DL (ref 8.6–10.5)
CHLORIDE SERPL-SCNC: 107 MMOL/L (ref 98–107)
CO2 SERPL-SCNC: 24.7 MMOL/L (ref 22–29)
CREAT SERPL-MCNC: 1.09 MG/DL (ref 0.57–1)
DEPRECATED RDW RBC AUTO: 49.4 FL (ref 37–54)
EGFRCR SERPLBLD CKD-EPI 2021: 52.4 ML/MIN/1.73
EOSINOPHIL # BLD AUTO: 0.11 10*3/MM3 (ref 0–0.4)
EOSINOPHIL NFR BLD AUTO: 1 % (ref 0.3–6.2)
ERYTHROCYTE [DISTWIDTH] IN BLOOD BY AUTOMATED COUNT: 14 % (ref 12.3–15.4)
GEN 5 1HR TROPONIN T REFLEX: 757 NG/L
GLOBULIN UR ELPH-MCNC: 3 GM/DL
GLUCOSE SERPL-MCNC: 107 MG/DL (ref 65–99)
HCT VFR BLD AUTO: 35.6 % (ref 34–46.6)
HGB BLD-MCNC: 11.2 G/DL (ref 12–15.9)
HOLD SPECIMEN: NORMAL
IMM GRANULOCYTES # BLD AUTO: 0.09 10*3/MM3 (ref 0–0.05)
IMM GRANULOCYTES NFR BLD AUTO: 0.8 % (ref 0–0.5)
INR PPP: 1.23 (ref 0.9–1.1)
LYMPHOCYTES # BLD AUTO: 1.39 10*3/MM3 (ref 0.7–3.1)
LYMPHOCYTES NFR BLD AUTO: 12 % (ref 19.6–45.3)
MAGNESIUM SERPL-MCNC: 1.8 MG/DL (ref 1.6–2.4)
MCH RBC QN AUTO: 30.2 PG (ref 26.6–33)
MCHC RBC AUTO-ENTMCNC: 31.5 G/DL (ref 31.5–35.7)
MCV RBC AUTO: 96 FL (ref 79–97)
MONOCYTES # BLD AUTO: 0.85 10*3/MM3 (ref 0.1–0.9)
MONOCYTES NFR BLD AUTO: 7.3 % (ref 5–12)
NEUTROPHILS NFR BLD AUTO: 78.4 % (ref 42.7–76)
NEUTROPHILS NFR BLD AUTO: 9.07 10*3/MM3 (ref 1.7–7)
NRBC BLD AUTO-RTO: 0 /100 WBC (ref 0–0.2)
NT-PROBNP SERPL-MCNC: 405 PG/ML (ref 0–1800)
PLATELET # BLD AUTO: 205 10*3/MM3 (ref 140–450)
PMV BLD AUTO: 10.2 FL (ref 6–12)
POTASSIUM SERPL-SCNC: 3.9 MMOL/L (ref 3.5–5.2)
PROT SERPL-MCNC: 7.3 G/DL (ref 6–8.5)
PROTHROMBIN TIME: 15.6 SECONDS (ref 11.7–14.2)
RBC # BLD AUTO: 3.71 10*6/MM3 (ref 3.77–5.28)
SODIUM SERPL-SCNC: 145 MMOL/L (ref 136–145)
TROPONIN T DELTA: -95 NG/L
TROPONIN T SERPL HS-MCNC: 852 NG/L
WBC NRBC COR # BLD AUTO: 11.57 10*3/MM3 (ref 3.4–10.8)

## 2024-12-08 PROCEDURE — 93005 ELECTROCARDIOGRAM TRACING: CPT | Performed by: EMERGENCY MEDICINE

## 2024-12-08 PROCEDURE — 71045 X-RAY EXAM CHEST 1 VIEW: CPT

## 2024-12-08 PROCEDURE — 83735 ASSAY OF MAGNESIUM: CPT | Performed by: EMERGENCY MEDICINE

## 2024-12-08 PROCEDURE — G0378 HOSPITAL OBSERVATION PER HR: HCPCS

## 2024-12-08 PROCEDURE — 85025 COMPLETE CBC W/AUTO DIFF WBC: CPT | Performed by: EMERGENCY MEDICINE

## 2024-12-08 PROCEDURE — 85730 THROMBOPLASTIN TIME PARTIAL: CPT | Performed by: EMERGENCY MEDICINE

## 2024-12-08 PROCEDURE — 85610 PROTHROMBIN TIME: CPT | Performed by: EMERGENCY MEDICINE

## 2024-12-08 PROCEDURE — 93005 ELECTROCARDIOGRAM TRACING: CPT

## 2024-12-08 PROCEDURE — 84484 ASSAY OF TROPONIN QUANT: CPT | Performed by: EMERGENCY MEDICINE

## 2024-12-08 PROCEDURE — 83880 ASSAY OF NATRIURETIC PEPTIDE: CPT | Performed by: EMERGENCY MEDICINE

## 2024-12-08 PROCEDURE — 80053 COMPREHEN METABOLIC PANEL: CPT | Performed by: EMERGENCY MEDICINE

## 2024-12-08 PROCEDURE — 99285 EMERGENCY DEPT VISIT HI MDM: CPT

## 2024-12-08 PROCEDURE — 36415 COLL VENOUS BLD VENIPUNCTURE: CPT

## 2024-12-08 RX ORDER — AMOXICILLIN 250 MG
2 CAPSULE ORAL 2 TIMES DAILY PRN
Status: DISCONTINUED | OUTPATIENT
Start: 2024-12-08 | End: 2024-12-09 | Stop reason: HOSPADM

## 2024-12-08 RX ORDER — METOPROLOL SUCCINATE 50 MG/1
50 TABLET, EXTENDED RELEASE ORAL DAILY
Status: DISCONTINUED | OUTPATIENT
Start: 2024-12-09 | End: 2024-12-09 | Stop reason: HOSPADM

## 2024-12-08 RX ORDER — DILTIAZEM HYDROCHLORIDE 180 MG/1
180 CAPSULE, COATED, EXTENDED RELEASE ORAL
Status: DISCONTINUED | OUTPATIENT
Start: 2024-12-09 | End: 2024-12-09 | Stop reason: HOSPADM

## 2024-12-08 RX ORDER — SODIUM CHLORIDE 0.9 % (FLUSH) 0.9 %
10 SYRINGE (ML) INJECTION EVERY 12 HOURS SCHEDULED
Status: DISCONTINUED | OUTPATIENT
Start: 2024-12-08 | End: 2024-12-09 | Stop reason: HOSPADM

## 2024-12-08 RX ORDER — ASPIRIN 81 MG/1
324 TABLET, CHEWABLE ORAL ONCE
Status: COMPLETED | OUTPATIENT
Start: 2024-12-08 | End: 2024-12-08

## 2024-12-08 RX ORDER — ACETAMINOPHEN 160 MG/5ML
650 SOLUTION ORAL EVERY 4 HOURS PRN
Status: DISCONTINUED | OUTPATIENT
Start: 2024-12-08 | End: 2024-12-09 | Stop reason: HOSPADM

## 2024-12-08 RX ORDER — POLYETHYLENE GLYCOL 3350 17 G/17G
17 POWDER, FOR SOLUTION ORAL DAILY PRN
Status: DISCONTINUED | OUTPATIENT
Start: 2024-12-08 | End: 2024-12-09 | Stop reason: HOSPADM

## 2024-12-08 RX ORDER — CLOPIDOGREL BISULFATE 75 MG/1
75 TABLET ORAL ONCE
Status: COMPLETED | OUTPATIENT
Start: 2024-12-08 | End: 2024-12-08

## 2024-12-08 RX ORDER — BISACODYL 10 MG
10 SUPPOSITORY, RECTAL RECTAL DAILY PRN
Status: DISCONTINUED | OUTPATIENT
Start: 2024-12-08 | End: 2024-12-09 | Stop reason: HOSPADM

## 2024-12-08 RX ORDER — NITROGLYCERIN 0.4 MG/1
0.4 TABLET SUBLINGUAL
Status: DISCONTINUED | OUTPATIENT
Start: 2024-12-08 | End: 2024-12-09 | Stop reason: HOSPADM

## 2024-12-08 RX ORDER — ACETAMINOPHEN 325 MG/1
650 TABLET ORAL EVERY 4 HOURS PRN
Status: DISCONTINUED | OUTPATIENT
Start: 2024-12-08 | End: 2024-12-09 | Stop reason: HOSPADM

## 2024-12-08 RX ORDER — PANTOPRAZOLE SODIUM 40 MG/1
40 TABLET, DELAYED RELEASE ORAL
Status: DISCONTINUED | OUTPATIENT
Start: 2024-12-09 | End: 2024-12-09 | Stop reason: HOSPADM

## 2024-12-08 RX ORDER — ACETAMINOPHEN 650 MG/1
650 SUPPOSITORY RECTAL EVERY 4 HOURS PRN
Status: DISCONTINUED | OUTPATIENT
Start: 2024-12-08 | End: 2024-12-09 | Stop reason: HOSPADM

## 2024-12-08 RX ORDER — BISACODYL 5 MG/1
5 TABLET, DELAYED RELEASE ORAL DAILY PRN
Status: DISCONTINUED | OUTPATIENT
Start: 2024-12-08 | End: 2024-12-09 | Stop reason: HOSPADM

## 2024-12-08 RX ORDER — SODIUM CHLORIDE 9 MG/ML
40 INJECTION, SOLUTION INTRAVENOUS AS NEEDED
Status: DISCONTINUED | OUTPATIENT
Start: 2024-12-08 | End: 2024-12-09 | Stop reason: HOSPADM

## 2024-12-08 RX ORDER — METOPROLOL SUCCINATE 50 MG/1
50 TABLET, EXTENDED RELEASE ORAL DAILY
COMMUNITY

## 2024-12-08 RX ORDER — SODIUM CHLORIDE 0.9 % (FLUSH) 0.9 %
10 SYRINGE (ML) INJECTION AS NEEDED
Status: DISCONTINUED | OUTPATIENT
Start: 2024-12-08 | End: 2024-12-09 | Stop reason: HOSPADM

## 2024-12-08 RX ADMIN — CLOPIDOGREL BISULFATE 75 MG: 75 TABLET ORAL at 14:47

## 2024-12-08 RX ADMIN — APIXABAN 5 MG: 5 TABLET, FILM COATED ORAL at 21:26

## 2024-12-08 RX ADMIN — ASPIRIN 81 MG CHEWABLE TABLET 324 MG: 81 TABLET CHEWABLE at 18:21

## 2024-12-08 RX ADMIN — Medication 10 ML: at 21:26

## 2024-12-08 NOTE — Clinical Note
Level of Care: Med/Surg [1]   Admitting Physician: MIRACLE CANALES [[id number]]   Attending Physician: MIRACLE CAANLES [[id number]]

## 2024-12-08 NOTE — H&P
"[facility] Medicine Services  History & Physical    Patient Name: Facundo Maguire  : 1945  MRN: [id number]  Primary Care Physician:  Frank Padilla MD  Date of admission: 2024  Date and Time of Service: 2024 at 18:00    Subjective      Chief Complaint: Palpitations    History of Present Illness: Facundo Maguire is a 79 y.o. female with a CMH of atrial fibrillation, nonobstructive CAD, htn who presented to [facility] on 2024 with  palpitations and atypical chest pain. She underwent ablation for her atrial fibrillation 24 and was discharged the following day. This morning she awoke to palpitations and \"discomfort\" in her neck and upper left arm. These symptoms continued intermittently through the morning, prompting her to present to the ED. They occur independent of exertion. She denies any chest pain/pressure, when asked to further characterize the neck/arm discomfort she is unable to except to state they only occur with the palpitations. Denies nausea/diaphoresis. Denies recent trauma to L neck/arm/shoulder. Since arrival to ED has had recurrence of palpitations but no further neck/arm discomfort.      Review of Systems   Constitutional:  Negative for activity change, appetite change, chills, diaphoresis and fever.   HENT: Negative.     Eyes: Negative.    Respiratory:  Negative for cough, chest tightness, shortness of breath and wheezing.    Cardiovascular:  Negative for leg swelling.        Episodic palpitations with associated left neck/upper arm \"discomfort\". Denies chest pain/pressure   Gastrointestinal: Negative.    Genitourinary: Negative.    Musculoskeletal: Negative.    Skin: Negative.    Neurological:  Negative for dizziness, tremors, syncope, weakness, light-headedness, numbness and headaches.   Psychiatric/Behavioral: Negative.         Personal History     Past Medical History:   Diagnosis Date    Acute hypokalemia     Arthritis     Atrial " fibrillation     Fibromyalgia     Hypertension     Palpitations        Past Surgical History:   Procedure Laterality Date    ABLATION OF DYSRHYTHMIC FOCUS      ANKLE SURGERY Left     Triple fracture    CARDIAC CATHETERIZATION      TUBAL ABDOMINAL LIGATION         Family History: family history includes Cancer in her brother; Thyroid disease in her sister. Otherwise pertinent FHx was reviewed and not pertinent to current issue.    Social History:  reports that she has never smoked. She has never been exposed to tobacco smoke. She has never used smokeless tobacco. She reports that she does not drink alcohol and does not use drugs.    Home Medications:  Prior to Admission Medications       Prescriptions Last Dose Informant Patient Reported? Taking?    dilTIAZem XR (DILACOR XR) 180 MG 24 hr capsule 12/8/2024  No Yes    Take 1 capsule by mouth Daily.    Eliquis 5 MG tablet tablet 12/8/2024  No Yes    Take 1 tablet by mouth 2 (Two) Times a Day.    lansoprazole (PREVACID) 30 MG capsule 12/8/2024  Yes Yes    Take 1 capsule by mouth Daily.    losartan (COZAAR) 100 MG tablet 12/8/2024  No Yes    Take 1 tablet by mouth Daily.    metoprolol succinate XL (TOPROL-XL) 50 MG 24 hr tablet 12/8/2024  Yes Yes    Take 1 tablet by mouth Daily.              Allergies:  Allergies   Allergen Reactions    Cyclobenzaprine Palpitations    Doxazosin Mesylate Other (See Comments)     itching    Lisinopril Cough    Tetracycline Nausea Only    Valsartan Itching     Itching , constant urination , nausea     Hydrochlorothiazide Other (See Comments)     Constant urination        Objective      Vitals:   Temp:  [98.3 °F (36.8 °C)] 98.3 °F (36.8 °C)  Heart Rate:  [58-74] 66  Resp:  [15] 15  BP: (134-154)/(59-81) 143/73  Body mass index is 32.12 kg/m².  Physical Exam  Constitutional:       General: She is not in acute distress.     Appearance: Normal appearance. She is normal weight.   HENT:      Head: Normocephalic and atraumatic.      Mouth/Throat:       Mouth: Mucous membranes are moist.      Pharynx: Oropharynx is clear.   Eyes:      Extraocular Movements: Extraocular movements intact.      Conjunctiva/sclera: Conjunctivae normal.      Pupils: Pupils are equal, round, and reactive to light.   Cardiovascular:      Rate and Rhythm: Normal rate. Rhythm irregular.      Pulses: Normal pulses.      Heart sounds: Normal heart sounds.   Pulmonary:      Effort: Pulmonary effort is normal.      Breath sounds: Normal breath sounds.   Abdominal:      General: Abdomen is flat. Bowel sounds are normal.      Palpations: Abdomen is soft.   Musculoskeletal:         General: No swelling, tenderness or deformity. Normal range of motion.      Cervical back: Normal range of motion and neck supple. No tenderness.      Right lower leg: No edema.      Left lower leg: No edema.   Skin:     General: Skin is warm and dry.   Neurological:      General: No focal deficit present.      Mental Status: She is alert and oriented to person, place, and time. Mental status is at baseline.         Diagnostic Data:  Lab Results (last 24 hours)       Procedure Component Value Units Date/Time    BNP [[id number]]  (Normal) Collected: 12/08/24 1620    Specimen: Blood Updated: 12/08/24 1659     proBNP 405.0 pg/mL     Narrative:      This assay is used as an aid in the diagnosis of individuals suspected of having heart failure. It can be used as an aid in the diagnosis of acute decompensated heart failure (ADHF) in patients presenting with signs and symptoms of ADHF to the emergency department (ED). In addition, NT-proBNP of <300 pg/mL indicates ADHF is not likely.    Age Range Result Interpretation  NT-proBNP Concentration (pg/mL:      <50             Positive            >450                   Gray                 300-450                    Negative             <300    50-75           Positive            >900                  Gray                300-900                  Negative            <300      >75              Positive            >1800                  Gray                300-1800                  Negative            <300    High Sensitivity Troponin T 2Hr [[id number]]  (Abnormal) Collected: 12/08/24 1620    Specimen: Blood Updated: 12/08/24 1652     HS Troponin T 757 ng/L      Troponin T Delta -95 ng/L     Narrative:      High Sensitive Troponin T Reference Range:  <14.0 ng/L- Negative Female for AMI  <22.0 ng/L- Negative Male for AMI  >=14 - Abnormal Female indicating possible myocardial injury.  >=22 - Abnormal Male indicating possible myocardial injury.   Clinicians would have to utilize clinical acumen, EKG, Troponin, and serial changes to determine if it is an Acute Myocardial Infarction or myocardial injury due to an underlying chronic condition.         High Sensitivity Troponin T [[id number]]  (Abnormal) Collected: 12/08/24 1414    Specimen: Blood Updated: 12/08/24 1449     HS Troponin T 852 ng/L     Narrative:      High Sensitive Troponin T Reference Range:  <14.0 ng/L- Negative Female for AMI  <22.0 ng/L- Negative Male for AMI  >=14 - Abnormal Female indicating possible myocardial injury.  >=22 - Abnormal Male indicating possible myocardial injury.   Clinicians would have to utilize clinical acumen, EKG, Troponin, and serial changes to determine if it is an Acute Myocardial Infarction or myocardial injury due to an underlying chronic condition.         Magnesium [[id number]]  (Normal) Collected: 12/08/24 1414    Specimen: Blood Updated: 12/08/24 1449     Magnesium 1.8 mg/dL     Comprehensive Metabolic Panel [[id number]]  (Abnormal) Collected: 12/08/24 1414    Specimen: Blood Updated: 12/08/24 1449     Glucose 107 mg/dL      BUN 19 mg/dL      Creatinine 1.09 mg/dL      Sodium 145 mmol/L      Potassium 3.9 mmol/L      Chloride 107 mmol/L      CO2 24.7 mmol/L      Calcium 9.4 mg/dL      Total Protein 7.3 g/dL      Albumin 4.3 g/dL      ALT (SGPT) 21 U/L      AST (SGOT) 48 U/L      Alkaline Phosphatase 89  U/L      Total Bilirubin 0.8 mg/dL      Globulin 3.0 gm/dL      A/G Ratio 1.4 g/dL      BUN/Creatinine Ratio 17.4     Anion Gap 13.3 mmol/L      eGFR 52.4 mL/min/1.73     Narrative:      GFR Normal >60  Chronic Kidney Disease <60  Kidney Failure <15    The GFR formula is only valid for adults with stable renal function between ages 18 and 70.    Extra Tubes [[id number]] Collected: 12/08/24 1414    Specimen: Blood, Venous Line Updated: 12/08/24 1445    Narrative:      The following orders were created for panel order Extra Tubes.  Procedure                               Abnormality         Status                     ---------                               -----------         ------                     Gold Top - SST[[id number]]                                   Final result                 Please view results for these tests on the individual orders.    Gold Top - SST [[id number]] Collected: 12/08/24 1414    Specimen: Blood Updated: 12/08/24 1445     Extra Tube Hold for add-ons.     Comment: Auto resulted.       Protime-INR [[id number]]  (Abnormal) Collected: 12/08/24 1414    Specimen: Blood Updated: 12/08/24 1434     Protime 15.6 Seconds      INR 1.23    aPTT [[id number]]  (Normal) Collected: 12/08/24 1414    Specimen: Blood Updated: 12/08/24 1434     PTT 26.7 seconds     CBC & Differential [[id number]]  (Abnormal) Collected: 12/08/24 1414    Specimen: Blood Updated: 12/08/24 1424    Narrative:      The following orders were created for panel order CBC & Differential.  Procedure                               Abnormality         Status                     ---------                               -----------         ------                     CBC Auto Differential[[id number]]        Abnormal            Final result                 Please view results for these tests on the individual orders.    CBC Auto Differential [[id number]]  (Abnormal) Collected: 12/08/24 1414    Specimen: Blood Updated: 12/08/24 1424     WBC 11.57  10*3/mm3      RBC 3.71 10*6/mm3      Hemoglobin 11.2 g/dL      Hematocrit 35.6 %      MCV 96.0 fL      MCH 30.2 pg      MCHC 31.5 g/dL      RDW 14.0 %      RDW-SD 49.4 fl      MPV 10.2 fL      Platelets 205 10*3/mm3      Neutrophil % 78.4 %      Lymphocyte % 12.0 %      Monocyte % 7.3 %      Eosinophil % 1.0 %      Basophil % 0.5 %      Immature Grans % 0.8 %      Neutrophils, Absolute 9.07 10*3/mm3      Lymphocytes, Absolute 1.39 10*3/mm3      Monocytes, Absolute 0.85 10*3/mm3      Eosinophils, Absolute 0.11 10*3/mm3      Basophils, Absolute 0.06 10*3/mm3      Immature Grans, Absolute 0.09 10*3/mm3      nRBC 0.0 /100 WBC              Imaging Results (Last 24 Hours)       Procedure Component Value Units Date/Time    XR Chest 1 View [[id number]] Collected: 12/08/24 1506     Updated: 12/08/24 1511    Narrative:      XR CHEST 1 VW    Date of Exam: 12/8/2024 2:30 PM EST    Indication: cp    Comparison: Two-view chest x-ray 8/23/2019    Findings:  Cardiac silhouette remains enlarged. Central pulmonary vessels appear prominent. No pneumothorax or large pleural effusion is seen.      Impression:      Impression:  Enlarged cardiac silhouette.  Prominent central pulmonary vascularity, question pulmonary vascular congestion/edema.      Electronically Signed: Nayana Wise    12/8/2024 3:09 PM EST    Workstation ID: [id number]              Assessment & Plan        This is a 79 y.o. female with:    Active and Resolved Problems  Active Hospital Problems    Diagnosis  POA    **Atypical chest pain [R07.89]  Yes    Coronary artery disease involving native coronary artery of native heart without angina pectoris [I25.10]  Yes    Essential hypertension [I10]  Yes    Palpitations [R00.2]  Yes      Resolved Hospital Problems   No resolved problems to display.       Palpitations  Atypical chest pain  Atrial fibrillation s/p ablation 12/6/24  CAD  - Troponin 852 -> 757 in the setting of recent ablation. ECG NSR w/ frequent PACs, no ischemic  signs appreciated  - Pt given ASA 324mg in ED  - Cardiology consulted  - Cont eliquis  - Cont metoprolol, diltiazem  - Telemetry monitoring    Hypertension  - holding home losartan d/t mild incr in Cr, pt took morning of admission. Resume in am after bmp recheck     VTE Prophylaxis:  Pharmacologic VTE prophylaxis orders are present.        The patient desires to be as follows:    CODE STATUS:    Level Of Support Discussed With: Patient  Code Status (Patient has no pulse and is not breathing): CPR (Attempt to Resuscitate)  Medical Interventions (Patient has pulse or is breathing): Full Support        Mary Quiroz, who can be contacted at [phone], is the designated person to make medical decisions on the patient's behalf if She is incapable of doing so. This was clarified with patient and/or next of kin on 12/8/2024 during the course of this H&P.    Admission Status:  I believe this patient meets observation status.    Expected Length of Stay: <2 midnights    PDMP and Medication Dispenses via Sidebar reviewed and consistent with patient reported medications.    I discussed the patient's findings and my recommendations with patient.      Signature:     This document has been electronically signed by Thai Clifford MD on December 8, 2024 19:04 [facility]

## 2024-12-08 NOTE — ED PROVIDER NOTES
Subjective   History of Present Illness  Chief complaint: Patient is a 79-year-old.  She has a history of A-fib and had ablation done on Friday.  She states this morning she awoke with discomfort in her chest.  Her heart seem to be racing.  She had pain that went up into her neck and down her left arm.  That dissipated over time and she presented here for further evaluation.  She restarted her Eliquis today.    Context:    Duration:    Timing:    Severity:    Associated Symptoms:        PCP:  LMP:      Review of Systems   HENT: Negative.     Respiratory: Negative.     Cardiovascular:  Positive for chest pain and palpitations.   Gastrointestinal:  Negative for abdominal pain.       Past Medical History:   Diagnosis Date    Acute hypokalemia     Arthritis     Atrial fibrillation     Fibromyalgia     Hypertension     Palpitations        Allergies   Allergen Reactions    Cyclobenzaprine Palpitations    Doxazosin Mesylate Other (See Comments)     itching    Lisinopril Cough    Tetracycline Nausea Only    Valsartan Itching     Itching , constant urination , nausea     Hydrochlorothiazide Other (See Comments)     Constant urination        Past Surgical History:   Procedure Laterality Date    ABLATION OF DYSRHYTHMIC FOCUS      ANKLE SURGERY Left     Triple fracture    CARDIAC CATHETERIZATION      TUBAL ABDOMINAL LIGATION         Family History   Problem Relation Age of Onset    Thyroid disease Sister     Cancer Brother     Clotting disorder Neg Hx        Social History     Socioeconomic History    Marital status:    Tobacco Use    Smoking status: Never     Passive exposure: Never    Smokeless tobacco: Never   Vaping Use    Vaping status: Never Used   Substance and Sexual Activity    Alcohol use: No    Drug use: No    Sexual activity: Not Currently     Birth control/protection: Post-menopausal           Objective   Physical Exam  Vitals and nursing note reviewed.   HENT:      Head: Normocephalic and atraumatic.    Cardiovascular:      Rate and Rhythm: Normal rate.      Comments: Occasional premature beat  Pulmonary:      Effort: Pulmonary effort is normal.   Abdominal:      Tenderness: There is no abdominal tenderness.   Musculoskeletal:      Cervical back: Normal range of motion.   Skin:     General: Skin is warm and dry.   Neurological:      Mental Status: She is alert.         Procedures           ED Course              [id number] Score: 6                                Results for orders placed or performed during the hospital encounter of 12/08/24   ECG 12 Lead Rhythm Change    Collection Time: 12/08/24  1:50 PM   Result Value Ref Range    QT Interval 420 ms    QTC Interval 430 ms   Comprehensive Metabolic Panel    Collection Time: 12/08/24  2:14 PM    Specimen: Blood   Result Value Ref Range    Glucose 107 (H) 65 - 99 mg/dL    BUN 19 8 - 23 mg/dL    Creatinine 1.09 (H) 0.57 - 1.00 mg/dL    Sodium 145 136 - 145 mmol/L    Potassium 3.9 3.5 - 5.2 mmol/L    Chloride 107 98 - 107 mmol/L    CO2 24.7 22.0 - 29.0 mmol/L    Calcium 9.4 8.6 - 10.5 mg/dL    Total Protein 7.3 6.0 - 8.5 g/dL    Albumin 4.3 3.5 - 5.2 g/dL    ALT (SGPT) 21 1 - 33 U/L    AST (SGOT) 48 (H) 1 - 32 U/L    Alkaline Phosphatase 89 39 - 117 U/L    Total Bilirubin 0.8 0.0 - 1.2 mg/dL    Globulin 3.0 gm/dL    A/G Ratio 1.4 g/dL    BUN/Creatinine Ratio 17.4 7.0 - 25.0    Anion Gap 13.3 5.0 - 15.0 mmol/L    eGFR 52.4 (L) >60.0 mL/min/1.73   Protime-INR    Collection Time: 12/08/24  2:14 PM    Specimen: Blood   Result Value Ref Range    Protime 15.6 (H) 11.7 - 14.2 Seconds    INR 1.23 (H) 0.90 - 1.10   aPTT    Collection Time: 12/08/24  2:14 PM    Specimen: Blood   Result Value Ref Range    PTT 26.7 22.7 - 35.4 seconds   High Sensitivity Troponin T    Collection Time: 12/08/24  2:14 PM    Specimen: Blood   Result Value Ref Range    HS Troponin T 852 (C) <14 ng/L   Magnesium    Collection Time: 12/08/24  2:14 PM    Specimen: Blood   Result Value Ref Range     Magnesium 1.8 1.6 - 2.4 mg/dL   CBC Auto Differential    Collection Time: 12/08/24  2:14 PM    Specimen: Blood   Result Value Ref Range    WBC 11.57 (H) 3.40 - 10.80 10*3/mm3    RBC 3.71 (L) 3.77 - 5.28 10*6/mm3    Hemoglobin 11.2 (L) 12.0 - 15.9 g/dL    Hematocrit 35.6 34.0 - 46.6 %    MCV 96.0 79.0 - 97.0 fL    MCH 30.2 26.6 - 33.0 pg    MCHC 31.5 31.5 - 35.7 g/dL    RDW 14.0 12.3 - 15.4 %    RDW-SD 49.4 37.0 - 54.0 fl    MPV 10.2 6.0 - 12.0 fL    Platelets 205 140 - 450 10*3/mm3    Neutrophil % 78.4 (H) 42.7 - 76.0 %    Lymphocyte % 12.0 (L) 19.6 - 45.3 %    Monocyte % 7.3 5.0 - 12.0 %    Eosinophil % 1.0 0.3 - 6.2 %    Basophil % 0.5 0.0 - 1.5 %    Immature Grans % 0.8 (H) 0.0 - 0.5 %    Neutrophils, Absolute 9.07 (H) 1.70 - 7.00 10*3/mm3    Lymphocytes, Absolute 1.39 0.70 - 3.10 10*3/mm3    Monocytes, Absolute 0.85 0.10 - 0.90 10*3/mm3    Eosinophils, Absolute 0.11 0.00 - 0.40 10*3/mm3    Basophils, Absolute 0.06 0.00 - 0.20 10*3/mm3    Immature Grans, Absolute 0.09 (H) 0.00 - 0.05 10*3/mm3    nRBC 0.0 0.0 - 0.2 /100 WBC   Gold Top - SST    Collection Time: 12/08/24  2:14 PM   Result Value Ref Range    Extra Tube Hold for add-ons.      XR Chest 1 View    Result Date: 12/8/2024  Impression: Enlarged cardiac silhouette. Prominent central pulmonary vascularity, question pulmonary vascular congestion/edema. Electronically Signed: Nayana Wise  12/8/2024 3:09 PM EST  Workstation ID: [id number]             Medical Decision Making  Patient was seen and evaluated for the above problem    Differential diagnosis includes but is not limited to ACS, A-fib with RVR, PE    Patient is not tachycardic here in the emergency department.  She presented with pain in her chest radiating into her neck and jaw and down her left arm.  Her troponin was elevated significantly.  However she did just have an ablation as well.  This will need to be trended here in the hospital.  She is not having active chest pain here.  She did receive  aspirin here in the hospital.  She started back on her Eliquis today.  Page and consultation placed to cardiology.  No signs of STEMI on EKG.  Considered CT chest but she is not having any symptoms at this point in time and I think less likely PE.  I spoke with  agrees to admit the patient.    Problems Addressed:  Chest pain, unspecified type: complicated acute illness or injury    Amount and/or Complexity of Data Reviewed  Labs: ordered. Decision-making details documented in ED Course.     Details: Labs reviewed by myself  Radiology: ordered and independent interpretation performed.     Details: X-ray reviewed by myself as above  ECG/medicine tests: ordered and independent interpretation performed.     Details: EKG interpreted by myself    Risk  OTC drugs.  Prescription drug management.  Decision regarding hospitalization.        Final diagnoses:   None   Chest pain    ED Disposition  ED Disposition       None            No follow-up provider specified.       Medication List      No changes were made to your prescriptions during this visit.            Arun Hernández,   12/08/24 2457

## 2024-12-08 NOTE — TELEPHONE ENCOUNTER
Daughter is concerned that her mother woke up not feeling well and complaints of palpitations. She was discharged eysterday after having an heart ablation for A-fib on Friday.   Triage completed and I advised to take her to the ED. She said that she will follow this advice.   Reason for Disposition   Age > 60 years  (Exception: Brief heartbeat symptoms that went away and now feels well.)    Additional Information   Negative: Passed out (i.e., lost consciousness, collapsed and was not responding)   Negative: Shock suspected (e.g., cold/pale/clammy skin, too weak to stand, low BP, rapid pulse)   Negative: Difficult to awaken or acting confused (e.g., disoriented, slurred speech)   Negative: Visible sweat on face or sweat dripping down face   Negative: Unable to walk, or can only walk with assistance (e.g., requires support)   Negative: [1] Received SHOCK from implantable cardiac defibrillator AND [2] persisting symptoms (i.e., palpitations, lightheadedness)   Negative: [1] Dizziness, lightheadedness, or weakness AND [2] heart beating very rapidly (e.g., > 140 / minute)   Negative: [1] Dizziness, lightheadedness, or weakness AND [2] heart beating very slowly (e.g., < 50 / minute)   Negative: Sounds like a life-threatening emergency to the triager   Negative: Chest pain   Negative: Implantable Cardiac Defibrillator (ICD) or a pacemaker symptoms or questions   Negative: Difficulty breathing   Negative: Dizziness, lightheadedness, or weakness   Negative: [1] Heart beating very rapidly (e.g., > 140 / minute) AND [2] present now  (Exception: During exercise.)   Negative: Heart beating very slowly (e.g., < 50 / minute)  (Exception: Athlete and heart rate normal for caller.)   Negative: New or worsened shortness of breath with activity (dyspnea on exertion)   Negative: Patient sounds very sick or weak to the triager   Negative: [1] Heart beating very rapidly (e.g., > 140 / minute) AND [2] not present now  (Exception: During  "exercise.)   Negative: [1] Skipped or extra beat(s) AND [2] increases with exercise or exertion   Negative: [1] Skipped or extra beat(s) AND [2] occurs 4 or more times per minute   Negative: New or worsened ankle swelling   Negative: History of heart disease (i.e., heart attack, bypass surgery, angina, angioplasty, CHF)  (Exception: Brief heartbeat symptoms that went away and now feels well.)    Answer Assessment - Initial Assessment Questions  1. DESCRIPTION: \"Please describe your heart rate or heartbeat that you are having\" (e.g., fast/slow, regular/irregular, skipped or extra beats, \"palpitations\")      *No Answer*  2. ONSET: \"When did it start?\" (Minutes, hours or days)       9 am   3. DURATION: \"How long does it last\" (e.g., seconds, minutes, hours)   Not sure   4. PATTERN \"Does it come and go, or has it been constant since it started?\"  \"Does it get worse with exertion?\"   \"Are you feeling it now?\"   Comes and goes   5. TAP: \"Using your hand, can you tap out what you are feeling on a chair or table in front of you, so that I can hear?\" (Note: not all patients can do this)     no  6. HEART RATE: \"Can you tell me your heart rate?\" \"How many beats in 15 seconds?\"  (Note: not all patients can do this)        65- 72  7. RECURRENT SYMPTOM: \"Have you ever had this before?\" If Yes, ask: \"When was the last time?\" and \"What happened that time?\"      Heart palpitations   8. CAUSE: \"What do you think is causing the palpitations?\"      Atrial fibrillation- had an ablation 12/6  9. CARDIAC HISTORY: \"Do you have any history of heart disease?\" (e.g., heart attack, angina, bypass surgery, angioplasty, arrhythmia)   A- fib,   10. OTHER SYMPTOMS: \"Do you have any other symptoms?\" (e.g., dizziness, chest pain, sweating, difficulty breathing)        Weakness, fatigue, short of breath with activity   11. PREGNANCY: \"Is there any chance you are pregnant?\" \"When was your last menstrual period?\"        No    Protocols used: Heart Rate " and Heartbeat Questions-ADULT-AH

## 2024-12-09 ENCOUNTER — APPOINTMENT (OUTPATIENT)
Dept: RESPIRATORY THERAPY | Facility: HOSPITAL | Age: 79
End: 2024-12-09
Payer: MEDICARE

## 2024-12-09 ENCOUNTER — APPOINTMENT (OUTPATIENT)
Dept: CARDIOLOGY | Facility: HOSPITAL | Age: 79
End: 2024-12-09
Payer: MEDICARE

## 2024-12-09 VITALS
WEIGHT: 193 LBS | RESPIRATION RATE: 16 BRPM | HEIGHT: 65 IN | DIASTOLIC BLOOD PRESSURE: 70 MMHG | BODY MASS INDEX: 32.15 KG/M2 | HEART RATE: 70 BPM | OXYGEN SATURATION: 95 % | SYSTOLIC BLOOD PRESSURE: 143 MMHG | TEMPERATURE: 98 F

## 2024-12-09 LAB
ANION GAP SERPL CALCULATED.3IONS-SCNC: 11.8 MMOL/L (ref 5–15)
AORTIC DIMENSIONLESS INDEX: 0.4 (DI)
BASOPHILS # BLD AUTO: 0.07 10*3/MM3 (ref 0–0.2)
BASOPHILS NFR BLD AUTO: 0.7 % (ref 0–1.5)
BH CV ECHO MEAS - AO MAX PG: 47.1 MMHG
BH CV ECHO MEAS - AO MEAN PG: 25 MMHG
BH CV ECHO MEAS - AO V2 MAX: 343 CM/SEC
BH CV ECHO MEAS - AO V2 VTI: 70.9 CM
BH CV ECHO MEAS - AVA(I,D): 1.24 CM2
BH CV ECHO MEAS - EDV(CUBED): 79.5 ML
BH CV ECHO MEAS - EDV(MOD-SP2): 71.7 ML
BH CV ECHO MEAS - EDV(MOD-SP4): 85.2 ML
BH CV ECHO MEAS - EF(MOD-BP): 68.8 %
BH CV ECHO MEAS - EF(MOD-SP2): 70.6 %
BH CV ECHO MEAS - EF(MOD-SP4): 67.3 %
BH CV ECHO MEAS - ESV(CUBED): 29.8 ML
BH CV ECHO MEAS - ESV(MOD-SP2): 21.1 ML
BH CV ECHO MEAS - ESV(MOD-SP4): 27.9 ML
BH CV ECHO MEAS - FS: 27.9 %
BH CV ECHO MEAS - IVS/LVPW: 0.92 CM
BH CV ECHO MEAS - IVSD: 1.2 CM
BH CV ECHO MEAS - LA DIMENSION: 4.7 CM
BH CV ECHO MEAS - LAT PEAK E' VEL: 9.7 CM/SEC
BH CV ECHO MEAS - LV DIASTOLIC VOL/BSA (35-75): 43.7 CM2
BH CV ECHO MEAS - LV MASS(C)D: 196.1 GRAMS
BH CV ECHO MEAS - LV MAX PG: 7.4 MMHG
BH CV ECHO MEAS - LV MEAN PG: 4 MMHG
BH CV ECHO MEAS - LV SYSTOLIC VOL/BSA (12-30): 14.3 CM2
BH CV ECHO MEAS - LV V1 MAX: 136 CM/SEC
BH CV ECHO MEAS - LV V1 VTI: 31.1 CM
BH CV ECHO MEAS - LVIDD: 4.3 CM
BH CV ECHO MEAS - LVIDS: 3.1 CM
BH CV ECHO MEAS - LVOT AREA: 2.8 CM2
BH CV ECHO MEAS - LVOT DIAM: 1.9 CM
BH CV ECHO MEAS - LVPWD: 1.3 CM
BH CV ECHO MEAS - MED PEAK E' VEL: 12.6 CM/SEC
BH CV ECHO MEAS - MR MAX PG: 67.9 MMHG
BH CV ECHO MEAS - MR MAX VEL: 412 CM/SEC
BH CV ECHO MEAS - MV A DUR: 0.13 SEC
BH CV ECHO MEAS - MV A MAX VEL: 60 CM/SEC
BH CV ECHO MEAS - MV DEC SLOPE: 1710 CM/SEC2
BH CV ECHO MEAS - MV DEC TIME: 0.18 SEC
BH CV ECHO MEAS - MV E MAX VEL: 136 CM/SEC
BH CV ECHO MEAS - MV E/A: 2.27
BH CV ECHO MEAS - MV MAX PG: 17.1 MMHG
BH CV ECHO MEAS - MV MEAN PG: 6 MMHG
BH CV ECHO MEAS - MV P1/2T: 37.5 MSEC
BH CV ECHO MEAS - MV V2 VTI: 26.9 CM
BH CV ECHO MEAS - MVA(P1/2T): 5.9 CM2
BH CV ECHO MEAS - MVA(VTI): 3.3 CM2
BH CV ECHO MEAS - PA ACC TIME: 0.1 SEC
BH CV ECHO MEAS - PA V2 MAX: 111 CM/SEC
BH CV ECHO MEAS - PULM A REVS DUR: 0.15 SEC
BH CV ECHO MEAS - PULM A REVS VEL: 33.5 CM/SEC
BH CV ECHO MEAS - PULM DIAS VEL: 55.3 CM/SEC
BH CV ECHO MEAS - PULM S/D: 0.63
BH CV ECHO MEAS - PULM SYS VEL: 34.9 CM/SEC
BH CV ECHO MEAS - RAP SYSTOLE: 15 MMHG
BH CV ECHO MEAS - RV MAX PG: 2.25 MMHG
BH CV ECHO MEAS - RV V1 MAX: 75 CM/SEC
BH CV ECHO MEAS - RV V1 VTI: 17.9 CM
BH CV ECHO MEAS - RVSP: 61 MMHG
BH CV ECHO MEAS - SV(LVOT): 88.2 ML
BH CV ECHO MEAS - SV(MOD-SP2): 50.6 ML
BH CV ECHO MEAS - SV(MOD-SP4): 57.3 ML
BH CV ECHO MEAS - SVI(LVOT): 45.3 ML/M2
BH CV ECHO MEAS - SVI(MOD-SP2): 26 ML/M2
BH CV ECHO MEAS - SVI(MOD-SP4): 29.4 ML/M2
BH CV ECHO MEAS - TAPSE (>1.6): 2.2 CM
BH CV ECHO MEAS - TR MAX PG: 46 MMHG
BH CV ECHO MEAS - TR MAX VEL: 339 CM/SEC
BH CV ECHO MEASUREMENTS AVERAGE E/E' RATIO: 12.2
BH CV XLRA - TDI S': 15.2 CM/SEC
BUN SERPL-MCNC: 16 MG/DL (ref 8–23)
BUN/CREAT SERPL: 20 (ref 7–25)
CALCIUM SPEC-SCNC: 9.2 MG/DL (ref 8.6–10.5)
CHLORIDE SERPL-SCNC: 106 MMOL/L (ref 98–107)
CO2 SERPL-SCNC: 23.2 MMOL/L (ref 22–29)
CREAT SERPL-MCNC: 0.8 MG/DL (ref 0.57–1)
DEPRECATED RDW RBC AUTO: 50 FL (ref 37–54)
EGFRCR SERPLBLD CKD-EPI 2021: 76 ML/MIN/1.73
EOSINOPHIL # BLD AUTO: 0.29 10*3/MM3 (ref 0–0.4)
EOSINOPHIL NFR BLD AUTO: 2.8 % (ref 0.3–6.2)
ERYTHROCYTE [DISTWIDTH] IN BLOOD BY AUTOMATED COUNT: 14.1 % (ref 12.3–15.4)
GLUCOSE SERPL-MCNC: 99 MG/DL (ref 65–99)
HCT VFR BLD AUTO: 33.5 % (ref 34–46.6)
HGB BLD-MCNC: 10.6 G/DL (ref 12–15.9)
IMM GRANULOCYTES # BLD AUTO: 0.08 10*3/MM3 (ref 0–0.05)
IMM GRANULOCYTES NFR BLD AUTO: 0.8 % (ref 0–0.5)
LEFT ATRIUM VOLUME INDEX: 39.5 ML/M2
LYMPHOCYTES # BLD AUTO: 1.63 10*3/MM3 (ref 0.7–3.1)
LYMPHOCYTES NFR BLD AUTO: 16 % (ref 19.6–45.3)
MCH RBC QN AUTO: 30.5 PG (ref 26.6–33)
MCHC RBC AUTO-ENTMCNC: 31.6 G/DL (ref 31.5–35.7)
MCV RBC AUTO: 96.5 FL (ref 79–97)
MONOCYTES # BLD AUTO: 0.77 10*3/MM3 (ref 0.1–0.9)
MONOCYTES NFR BLD AUTO: 7.6 % (ref 5–12)
NEUTROPHILS NFR BLD AUTO: 7.34 10*3/MM3 (ref 1.7–7)
NEUTROPHILS NFR BLD AUTO: 72.1 % (ref 42.7–76)
NRBC BLD AUTO-RTO: 0 /100 WBC (ref 0–0.2)
PLATELET # BLD AUTO: 198 10*3/MM3 (ref 140–450)
PMV BLD AUTO: 10.6 FL (ref 6–12)
POTASSIUM SERPL-SCNC: 3.9 MMOL/L (ref 3.5–5.2)
QT INTERVAL: 420 MS
QTC INTERVAL: 430 MS
RBC # BLD AUTO: 3.47 10*6/MM3 (ref 3.77–5.28)
SINUS: 2.7 CM
SODIUM SERPL-SCNC: 141 MMOL/L (ref 136–145)
STJ: 1.9 CM
WBC NRBC COR # BLD AUTO: 10.18 10*3/MM3 (ref 3.4–10.8)

## 2024-12-09 PROCEDURE — 93005 ELECTROCARDIOGRAM TRACING: CPT | Performed by: NURSE PRACTITIONER

## 2024-12-09 PROCEDURE — 85025 COMPLETE CBC W/AUTO DIFF WBC: CPT | Performed by: STUDENT IN AN ORGANIZED HEALTH CARE EDUCATION/TRAINING PROGRAM

## 2024-12-09 PROCEDURE — 80048 BASIC METABOLIC PNL TOTAL CA: CPT | Performed by: STUDENT IN AN ORGANIZED HEALTH CARE EDUCATION/TRAINING PROGRAM

## 2024-12-09 PROCEDURE — G0378 HOSPITAL OBSERVATION PER HR: HCPCS

## 2024-12-09 PROCEDURE — 93306 TTE W/DOPPLER COMPLETE: CPT

## 2024-12-09 RX ADMIN — METOPROLOL SUCCINATE 50 MG: 50 TABLET, EXTENDED RELEASE ORAL at 08:52

## 2024-12-09 RX ADMIN — PANTOPRAZOLE SODIUM 40 MG: 40 TABLET, DELAYED RELEASE ORAL at 05:04

## 2024-12-09 RX ADMIN — DILTIAZEM HYDROCHLORIDE 180 MG: 180 CAPSULE, EXTENDED RELEASE ORAL at 08:52

## 2024-12-09 RX ADMIN — Medication 10 ML: at 08:52

## 2024-12-09 RX ADMIN — APIXABAN 5 MG: 5 TABLET, FILM COATED ORAL at 08:52

## 2024-12-09 NOTE — DISCHARGE SUMMARY
[facility] Medicine Services  Discharge Summary    Date of Service: 2024  Patient Name: Facundo Maguire  : 1945  MRN: [id number]    Date of Admission: 2024  Discharge Diagnosis:   Palpitations  Atypical chest pain  Non-MI related elevated troponin secondary to recent ablation  Atrial fibrillation  CAD  Hypertension    Date of Discharge: 2024  Primary Care Physician: Frank Padilla MD      Presenting Problem:   Atypical chest pain [R07.89]  Chest pain, unspecified type [R07.9]    Active and Resolved Hospital Problems:  Active Hospital Problems    Diagnosis POA    **Atypical chest pain [R07.89] Yes    Coronary artery disease involving native coronary artery of native heart without angina pectoris [I25.10] Yes    Essential hypertension [I10] Yes    Palpitations [R00.2] Yes      Resolved Hospital Problems   No resolved problems to display.         Hospital Course     HPI:    Patient is a 79-year-old female who presented to the hospital with complaints of palpitations.  Please see H&P for details.    Hospital Course:  The patient has a history of atria nontachycardic and her palpitations resolved generally l fibrillation and underwent ablation on .  On presentation here she was once she was admitted. She remained in NSR during her hospital stay with some PACs.  She will continue her metoprolol and diltiazem per cardiology recommendations.  Echocardiogram was performed prior to discharge and is pending at the time of discharge.  She will also continue her home losartan for BP control.  She will follow-up with cardiology in 2 weeks.  They are requesting Holter monitor on discharge.        DISCHARGE Follow Up Recommendations for labs and diagnostics: Follow-up with cardiology in 2 weeks.        Day of Discharge     Vital Signs:  Temp:  [96.9 °F (36.1 °C)-98 °F (36.7 °C)] 98 °F (36.7 °C)  Heart Rate:  [58-82] 70  Resp:  [15-20] 16  BP: (125-175)/(57-90) 143/70    Physical  Exam:  Physical Exam   General Appearance:  Alert, cooperative, no distress, appears stated age  Head:  Normocephalic, without obvious abnormality, atraumatic  Eyes:  PERRL, conjunctiva/corneas clear, EOM's intact, fundi benign, both eyes  Ears:  Normal TM's and external ear canals, both ears  Nose: Nares normal, septum midline, mucosa normal, no drainage or sinus tenderness  Throat: Lips, mucosa, and tongue normal; teeth and gums normal  Neck: Supple, symmetrical, trachea midline, no adenopathy, thyroid: not enlarged, symmetric, no tenderness/mass/nodules, no carotid bruit or JVD  Lungs:   Clear to auscultation bilaterally, respirations unlabored  Heart:  Regular rate and rhythm, S1, S2 normal, no murmur, rub or gallop  Abdomen:  Soft, non-tender, bowel sounds active all four quadrants,  no masses, no organomegaly  Extremities: Extremities normal, atraumatic, no cyanosis or edema  Pulses: 2+ and symmetric  Skin: Skin color, texture, turgor normal, no rashes or lesions  Neurologic: Normal        Pertinent  and/or Most Recent Results     LAB RESULTS:      Lab 12/09/24  0503 12/08/24  1414 12/06/24  1029   WBC 10.18 11.57* 7.12   HEMOGLOBIN 10.6* 11.2* 12.6   HEMATOCRIT 33.5* 35.6 39.5   PLATELETS 198 205 226   NEUTROS ABS 7.34* 9.07* 5.21   IMMATURE GRANS (ABS) 0.08* 0.09* 0.03   LYMPHS ABS 1.63 1.39 1.10   MONOS ABS 0.77 0.85 0.48   EOS ABS 0.29 0.11 0.23   MCV 96.5 96.0 93.6   PROTIME  --  15.6* 15.1*   APTT  --  26.7  --          Lab 12/09/24  0732 12/08/24  1414 12/06/24  1029   SODIUM 141 145 140   POTASSIUM 3.9 3.9 3.9   CHLORIDE 106 107 103   CO2 23.2 24.7 25.4   ANION GAP 11.8 13.3 11.6   BUN 16 19 10   CREATININE 0.80 1.09* 0.83   EGFR 76.0 52.4* 72.7   GLUCOSE 99 107* 110*   CALCIUM 9.2 9.4 9.3   MAGNESIUM  --  1.8 1.9         Lab 12/08/24  1414 12/06/24  1029   TOTAL PROTEIN 7.3 7.4   ALBUMIN 4.3 4.3   GLOBULIN 3.0 3.1   ALT (SGPT) 21 10   AST (SGOT) 48* 32   BILIRUBIN 0.8 0.5   ALK PHOS 89 95         Lab  12/08/24  1620 12/08/24  1414 12/06/24  1029   PROBNP 405.0  --   --    HSTROP T 757* 852*  --    PROTIME  --  15.6* 15.1*   INR  --  1.23* 1.18*                 Brief Urine Lab Results       None          Microbiology Results (last 10 days)       ** No results found for the last 240 hours. **            XR Chest 1 View    Result Date: 12/8/2024  Impression: Impression: Enlarged cardiac silhouette. Prominent central pulmonary vascularity, question pulmonary vascular congestion/edema. Electronically Signed: Nayana Wise  12/8/2024 3:09 PM EST  Workstation ID: [id number]             Results for orders placed during the hospital encounter of 04/17/20    Adult Transthoracic Echo Complete W/ Cont if Necessary Per Protocol    Interpretation Summary  · Left ventricular wall thickness is consistent with mild concentric hypertrophy.  · Estimated EF = 50%.  · Left ventricular systolic function is normal.  · Right ventricular cavity is mildly dilated.  · Left atrial cavity size is moderately dilated.  · Mild mitral valve regurgitation is present  · Mild tricuspid valve regurgitation is present.      Labs Pending at Discharge:  Pending Results       Procedure [Order ID] Specimen - Date/Time    Adult Transthoracic Echo Complete W/ Cont if Necessary Per Protocol [[id number]]     Holter Monitor - 72 Hour Up To 15 Days [[id number]]             Procedures Performed           Consults:   Consults       Date and Time Order Name Status Description    12/8/2024  4:13 PM Cardiology (on-call MD unless specified)      12/8/2024  3:43 PM Hospitalist (on-call MD unless specified)                Discharge Details        Discharge Medications        Continue These Medications        Instructions Start Date   dilTIAZem  MG 24 hr capsule  Commonly known as: DILACOR XR   180 mg, Oral, Daily      Eliquis 5 MG tablet tablet  Generic drug: apixaban   5 mg, Oral, 2 Times Daily      lansoprazole 30 MG capsule  Commonly known as: PREVACID   30 mg,  Daily      losartan 100 MG tablet  Commonly known as: COZAAR   100 mg, Oral, Daily      metoprolol succinate XL 50 MG 24 hr tablet  Commonly known as: TOPROL-XL   50 mg, Daily               Allergies   Allergen Reactions    Cyclobenzaprine Palpitations    Doxazosin Mesylate Other (See Comments)     itching    Lisinopril Cough    Tetracycline Nausea Only    Valsartan Itching     Itching , constant urination , nausea     Hydrochlorothiazide Other (See Comments)     Constant urination          Discharge Disposition:     Home or Self Care    Diet:  Hospital:  Diet Order   Procedures    Diet: Cardiac; Healthy Heart (2-3 Na+); Texture: Regular (IDDSI 7); Fluid Consistency: Thin (IDDSI 0)         Discharge Activity:         CODE STATUS:  Code Status and Medical Interventions: CPR (Attempt to Resuscitate); Full Support   Ordered at: 12/08/24 1853     Level Of Support Discussed With:    Patient     Code Status (Patient has no pulse and is not breathing):    CPR (Attempt to Resuscitate)     Medical Interventions (Patient has pulse or is breathing):    Full Support         Future Appointments   Date Time Provider Department Center   1/8/2025  2:45 PM Gee Barakat MD [facility]   3/12/2025  9:00 AM Jennifer Gabriel APRN [facility]       Additional Instructions for the Follow-ups that You Need to Schedule       Discharge Follow-up with Specified Provider: Follow-up with cardiology in 2 weeks.; 2 Weeks   As directed      To: Follow-up with cardiology in 2 weeks.   Follow Up: 2 Weeks                Time spent on Discharge including face to face service: Greater than 30 minutes    Signature: Electronically signed by Chilo Beckman MD, 12/09/24, 13:52 EST.  [facility] Hospitalist Team

## 2024-12-09 NOTE — OUTREACH NOTE
Medical Week 1 Survey      Flowsheet Row Responses   [facility] facility patient discharged from? [facility]   Does the patient have one of the following disease processes/diagnoses(primary or secondary)? Other   Week 1 attempt successful? No   Unsuccessful attempts Attempt 1   Revoke Readmitted            [facility] - Registered Nurse

## 2024-12-09 NOTE — PLAN OF CARE
Goal Outcome Evaluation:           Patient being discharge home.

## 2024-12-09 NOTE — CONSULTS
[facility]   CONSULT NOTE    Referring Provider: Chilo Beckman MD    Reason for Consultation: palpitations      Patient Care Team:  Frank Padilla MD as PCP - General (Internal Medicine)  Gee Barakat MD as Consulting Physician (Cardiology)      Chief complaint: racing heart rate    History of present illness:  Facundo Maguire is a 79 y.o. female with past medical history of  atrial ablation on 12/6/2024, chronic anticoagulation with Eliquis, hypertension who presented to the ED with sensation of racing heart. Patient had an atrial fibrillation ablation on 12/6/24 with Dr. Barakat. She reports waking up with chest discomfort and racing heart. She reports some radiation with the chest pain. Patient was given IV lopressor in the ED. She reports feeling better today than yesterday.  Patient denies dizziness or lightheadedness, nausea, shortness of breath.      Review of Systems   Constitutional: Negative for fever.   Cardiovascular:  Positive for dyspnea on exertion and palpitations. Negative for chest pain.   Gastrointestinal:  Negative for nausea.   Neurological:  Negative for dizziness and light-headedness.   All other systems reviewed and are negative.      History  Past Medical History:   Diagnosis Date    Acute hypokalemia     Arthritis     Atrial fibrillation     Fibromyalgia     Hypertension     Palpitations        Past Surgical History:   Procedure Laterality Date    ABLATION OF DYSRHYTHMIC FOCUS      ANKLE SURGERY Left     Triple fracture    CARDIAC CATHETERIZATION      TUBAL ABDOMINAL LIGATION         Family History   Problem Relation Age of Onset    Thyroid disease Sister     Cancer Brother     Clotting disorder Neg Hx        Social History     Tobacco Use    Smoking status: Never     Passive exposure: Never    Smokeless tobacco: Never   Vaping Use    Vaping status: Never Used   Substance Use Topics    Alcohol use: No    Drug use: No        Medications  "Prior to Admission   Medication Sig Dispense Refill Last Dose/Taking    dilTIAZem XR (DILACOR XR) 180 MG 24 hr capsule Take 1 capsule by mouth Daily. 90 capsule 3 12/8/2024    Eliquis 5 MG tablet tablet Take 1 tablet by mouth 2 (Two) Times a Day. 180 tablet 1 12/8/2024    lansoprazole (PREVACID) 30 MG capsule Take 1 capsule by mouth Daily.   12/8/2024    losartan (COZAAR) 100 MG tablet Take 1 tablet by mouth Daily. 90 tablet 3 12/8/2024    metoprolol succinate XL (TOPROL-XL) 50 MG 24 hr tablet Take 1 tablet by mouth Daily.   12/8/2024         Cyclobenzaprine, Doxazosin mesylate, Lisinopril, Tetracycline, Valsartan, and Hydrochlorothiazide    Scheduled Meds:  apixaban, 5 mg, Oral, BID  dilTIAZem CD, 180 mg, Oral, Q24H  metoprolol succinate XL, 50 mg, Oral, Daily  pantoprazole, 40 mg, Oral, Q AM  sodium chloride, 10 mL, Intravenous, Q12H        Continuous Infusions:       PRN Meds:    acetaminophen **OR** acetaminophen **OR** acetaminophen    senna-docusate sodium **AND** polyethylene glycol **AND** bisacodyl **AND** bisacodyl    nitroglycerin    sodium chloride    sodium chloride      VITAL SIGNS  Vitals:    12/09/24 0014 12/09/24 0316 12/09/24 0851 12/09/24 1051   BP: 138/72 151/72 175/90 143/70   BP Location: Left arm Left arm Left arm Left arm   Patient Position: Lying Lying  Lying   Pulse: 82 68 71 70   Resp: 15 20 20 16   Temp: 97.5 °F (36.4 °C) 97.9 °F (36.6 °C) 96.9 °F (36.1 °C) 98 °F (36.7 °C)   TempSrc: Oral Oral Oral Oral   SpO2: 95% 94% 95% 95%   Weight:       Height:           Flowsheet Rows      Flowsheet Row First Filed Value   Admission Height 165.1 cm (65\") Documented at 12/08/2024 1338   Admission Weight 87.5 kg (193 lb) Documented at 12/08/2024 1338             TELEMETRY: sinus rhythm with frequent PACs    Physical Exam  VITALS REVIEWED    General:      well developed, in no acute distress.    Head:      normocephalic and atraumatic.    Eyes:      PERRL/EOM intact, conjunctiva and sclera clear with " out nystagmus.    Neck:      no masses, thyromegaly,  trachea central with normal respiratory effort and PMI displaced laterally  Lungs:      Clear to auscultation bilaterally  Heart:       Regular rhythm and rate  Msk:      no deformity or scoliosis noted of thoracic or lumbar spine.    Pulses:      pulses normal in all 4 extremities.    Extremities:       No lower extremity edema    Neurologic:      no focal deficits.   alert oriented x3  Skin:      intact without lesions or rashes.    Psych:      alert and cooperative; normal mood and affect; normal attention span and concentration.        LAB RESULTS (LAST 7 DAYS)    CMP   Results from last 7 days   Lab Units 12/09/24  0732 12/08/24  1414 12/06/24  1029   SODIUM mmol/L 141 145 140   POTASSIUM mmol/L 3.9 3.9 3.9   CHLORIDE mmol/L 106 107 103   CO2 mmol/L 23.2 24.7 25.4   BUN mg/dL 16 19 10   CREATININE mg/dL 0.80 1.09* 0.83   GLUCOSE mg/dL 99 107* 110*   ALBUMIN g/dL  --  4.3 4.3   BILIRUBIN mg/dL  --  0.8 0.5   ALK PHOS U/L  --  89 95   AST (SGOT) U/L  --  48* 32   ALT (SGPT) U/L  --  21 10       BMP  Results from last 7 days   Lab Units 12/09/24  0732 12/08/24  1414 12/06/24  1029   SODIUM mmol/L 141 145 140   POTASSIUM mmol/L 3.9 3.9 3.9   CHLORIDE mmol/L 106 107 103   CO2 mmol/L 23.2 24.7 25.4   BUN mg/dL 16 19 10   CREATININE mg/dL 0.80 1.09* 0.83   GLUCOSE mg/dL 99 107* 110*   MAGNESIUM mg/dL  --  1.8 1.9       CBC  Results from last 7 days   Lab Units 12/09/24  0503 12/08/24  1414 12/06/24  1029   WBC 10*3/mm3 10.18 11.57* 7.12   RBC 10*6/mm3 3.47* 3.71* 4.22   HEMOGLOBIN g/dL 10.6* 11.2* 12.6   HEMATOCRIT % 33.5* 35.6 39.5   MCV fL 96.5 96.0 93.6   PLATELETS 10*3/mm3 198 205 226       ProBNP        TROPONIN  Results from last 7 days   Lab Units 12/08/24  1620   HSTROP T ng/L 757*       Creatinine Clearance  Estimated Creatinine Clearance: 64.3 mL/min (by C-G formula based on SCr of 0.8 mg/dL).      Radiology  XR Chest 1 View    Result Date:  12/8/2024  Impression: Enlarged cardiac silhouette. Prominent central pulmonary vascularity, question pulmonary vascular congestion/edema. Electronically Signed: Nayana Wise  12/8/2024 3:09 PM EST  Workstation ID: [id number]     EKG    I personally viewed and interpreted the patient's EKG/Telemetry data:  ECG 12 Lead Rhythm Change   Final Result   HEART RATE=60  bpm   RR Wigphrnj=157  ms   LA Opvnrznv=064  ms   P Horizontal Axis=58  deg   P Front Axis=52  deg   QRSD Interval=81  ms   QT Deedaqwj=178  ms   NLvK=779  ms   QRS Axis=-22  deg   T Wave Axis=67  deg   - ABNORMAL ECG -   Sinus rhythm   Atrial premature complexes   Probable  LVH with secondary repol abnrm   Inferior  infarct, old   No previous ECG available for comparison   Electronically Signed By: Arun Hernández (NICOLA) 2024-12-09 05:54:01   Date and Time of Study:2024-12-08 13:50:46      ECG 12 Lead Rhythm Change    (Results Pending)       ECHOCARDIOGRAM:  Results for orders placed during the hospital encounter of 04/17/20    Adult Transthoracic Echo Complete W/ Cont if Necessary Per Protocol    Interpretation Summary  · Left ventricular wall thickness is consistent with mild concentric hypertrophy.  · Estimated EF = 50%.  · Left ventricular systolic function is normal.  · Right ventricular cavity is mildly dilated.  · Left atrial cavity size is moderately dilated.  · Mild mitral valve regurgitation is present  · Mild tricuspid valve regurgitation is present.      STRESS MYOVIEW:      CARDIAC CATHETERIZATION:  No results found for this or any previous visit.      OTHER:         Assessment & Plan       Atypical chest pain    Essential hypertension    Palpitations    Coronary artery disease involving native coronary artery of native heart without angina pectoris  Atrial fibrillation s/p ablation    PLAN    Atrial fibrillation s/p ablation  -ablation on 12/6/24 with Dr. Barakat  -sinus rhythm with PACs currently  -elevated serial troponin due to recent  ablation (852 and 757)  -Cardizem 180mg daily  -Eliquis for stroke prevention  -has f/u appointment  -Home with 2 week monitor  -continue current medications    Hypertension  -controlled with medication    I discussed the patients findings and my recommendations with patient and nurse.  Further recommendations per Dr. Barakat.    SYBIL Suarez  12/09/24  10:56 EST  Electronically signed by SYBIL Suarez, 12/09/24, 11:04 AM EST.

## 2024-12-09 NOTE — PLAN OF CARE
Problem: Adult Inpatient Plan of Care  Goal: Absence of Hospital-Acquired Illness or Injury  Intervention: Identify and Manage Fall Risk  Description: Perform standard risk assessment on admission using a validated tool or comprehensive approach appropriate to the patient; reassess fall risk frequently, with change in status or transfer to another level of care.  Communicate risk to interprofessional healthcare team; ensure fall risk visible cue.  Determine need for increased observation, equipment and environmental modification, as well as use of supportive, nonskid footwear.  Adjust safety measures to individual needs and identified risk factors.  Reinforce the importance of active participation with fall risk prevention, safety, and physical activity with the patient and family.  Perform regular intentional rounding to assess need for position change, pain assessment and personal needs, including assistance with toileting.  Recent Flowsheet Documentation  Taken 12/9/2024 0200 by Jeremy Patton LPN  Safety Promotion/Fall Prevention:   safety round/check completed   room organization consistent   nonskid shoes/slippers when out of bed   muscle strengthening facilitated   mobility aid in reach   lighting adjusted   clutter free environment maintained   assistive device/personal items within reach  Taken 12/9/2024 0000 by Jeremy Patton LPN  Safety Promotion/Fall Prevention:   safety round/check completed   room organization consistent   nonskid shoes/slippers when out of bed   muscle strengthening facilitated   mobility aid in reach   lighting adjusted   clutter free environment maintained   assistive device/personal items within reach  Taken 12/8/2024 2200 by Jeremy Patton LPN  Safety Promotion/Fall Prevention:   safety round/check completed   room organization consistent   nonskid shoes/slippers when out of bed   muscle strengthening facilitated   mobility aid in reach   lighting adjusted    fall prevention program maintained   clutter free environment maintained   assistive device/personal items within reach   activity supervised  Intervention: Prevent Skin Injury  Description: Perform a screening for skin injury risk, such as pressure or moisture-associated skin damage on admission and at regular intervals throughout hospital stay.  Keep all areas of skin (especially folds) clean and dry.  Maintain adequate skin hydration.  Relieve and redistribute pressure and protect bony prominences and skin at risk for injury; implement measures based on patient-specific risk factors.  Match turning and repositioning schedule to clinical condition.  Encourage weight shift frequently; assist with reposition if unable to complete independently.  Float heels off bed; avoid pressure on the Achilles tendon.  Keep skin free from extended contact with medical devices.  Optimize nutrition and hydration.  Encourage functional activity and mobility, as early as tolerated.  Use aids (e.g., slide boards, mechanical lift) during transfer.  Recent Flowsheet Documentation  Taken 12/9/2024 0200 by Jeremy Patton LPN  Body Position: position changed independently  Taken 12/9/2024 0000 by Jeremy Patton LPN  Body Position: position changed independently  Taken 12/8/2024 2200 by Jeremy Patton LPN  Body Position: position changed independently  Intervention: Prevent Infection  Description: Maintain skin and mucous membrane integrity; promote hand, oral and pulmonary hygiene.  Optimize fluid balance, nutrition, sleep and glycemic control to maximize infection resistance.  Identify potential sources of infection early to prevent or mitigate progression of infection (e.g., wound, lines, devices).  Evaluate ongoing need for invasive devices; remove promptly when no longer indicated.  Review vaccination status.  Recent Flowsheet Documentation  Taken 12/9/2024 0200 by Jeremy Patton LPN  Infection Prevention:    visitors restricted/screened   single patient room provided   rest/sleep promoted   personal protective equipment utilized   hand hygiene promoted  Taken 12/9/2024 0000 by Jeremy Patton LPN  Infection Prevention:   visitors restricted/screened   single patient room provided   rest/sleep promoted   hand hygiene promoted   personal protective equipment utilized  Taken 12/8/2024 2200 by Jeremy Patton LPN  Infection Prevention:   visitors restricted/screened   personal protective equipment utilized   rest/sleep promoted   single patient room provided   hand hygiene promoted  Goal: Optimal Comfort and Wellbeing  Intervention: Monitor Pain and Promote Comfort  Description: Assess pain level, treatment efficacy and patient response at regular intervals using a consistent pain scale.  Consider the presence and impact of preexisting chronic pain.  Encourage patient and caregiver involvement in pain assessment, interventions and safety measures.  Promote activity; balance with sleep and rest to enhance healing.  Recent Flowsheet Documentation  Taken 12/9/2024 0000 by Jeremy Patton LPN  Pain Management Interventions:   position adjusted   pain management plan reviewed with patient/caregiver   medication offered but refused  Intervention: Provide Person-Centered Care  Description: Use a family-focused approach to care; encourage support system presence and participation.  Develop trust and rapport by proactively providing information, encouraging questions, addressing concerns and offering reassurance.  Acknowledge emotional response to hospitalization.  Recognize and utilize personal coping strategies and strengths; develop goals via shared decision-making.  Honor spiritual and cultural preferences.  Recent Flowsheet Documentation  Taken 12/9/2024 0000 by Jeremy Patton LPN  Trust Relationship/Rapport: care explained   Goal Outcome Evaluation: Plan of care reviewed with , pt remain in afib , ablation  performed 12/6 , progress on going review with pt of MD to see in am for the afib cont to monitor pt progress toward goal

## 2024-12-09 NOTE — ED NOTES
Nursing report ED to floor  Facundo Maguire  79 y.o.  female    HPI:   Chief Complaint   Patient presents with    Palpitations     Ablation for atrial fib on Friday, feeling of irregular HR today. Some SOA, no CP. Palpitations.        Admitting doctor:   Thai Clifford MD    Admitting diagnosis:   The encounter diagnosis was Chest pain, unspecified type.    Code status:   Current Code Status       Date Active Code Status Order ID Comments User Context       12/8/2024 1853 CPR (Attempt to Resuscitate) [id number]  Thai Clifford MD ED        Question Answer    Code Status (Patient has no pulse and is not breathing) CPR (Attempt to Resuscitate)    Medical Interventions (Patient has pulse or is breathing) Full Support    Level Of Support Discussed With Patient                    Allergies:   Cyclobenzaprine, Doxazosin mesylate, Lisinopril, Tetracycline, Valsartan, and Hydrochlorothiazide    Isolation:  No active isolations     Fall Risk:  Fall Risk Assessment was completed, and patient is at low risk for falls.   Predictive Model Details         9 (Low) Factor Value    Calculated 12/8/2024 20:24 Age 79    Risk of Fall Model Active Peripheral IV Present     Imaging order in this encounter Present     Diastolic BP 57     Magnesium 1.8 mg/dL     Hollis Scale not on file     Number of Distinct Medication Classes administered 2     Total Bilirubin 0.8 mg/dL     Creatinine 1.09 mg/dL     Cardiac Assessment X     Chloride 107 mmol/L     Days after Admission 0.272     ALT 21 U/L     Calcium 9.4 mg/dL     Albumin 4.3 g/dL     Potassium 3.9 mmol/L     Respiratory Rate 15         Weight:       12/08/24  1338   Weight: 87.5 kg (193 lb)       Intake and Output  No intake or output data in the 24 hours ending 12/08/24 2027    Diet:   Dietary Orders (From admission, onward)       Start     Ordered    12/08/24 1819  Diet: Cardiac; Healthy Heart (2-3 Na+); Texture: Regular (IDDSI 7); Fluid Consistency: Thin (IDDSI 0)  Diet  Effective Now        References:    Diet Order [facility]   Question Answer Comment   Diets: Cardiac    Cardiac Diet: Healthy Heart (2-3 Na+)    Texture: Regular (IDDSI 7)    Fluid Consistency: Thin (IDDSI 0)        12/08/24 1818                     Most recent vitals:   Vitals:    12/08/24 1847 12/1947 12/08/24 2002 12/08/24 2017   BP: 147/77 134/71 134/58 125/57   Pulse: 66 75 70 70   Resp:       Temp:       TempSrc:       SpO2: 95% 94% 94% 93%   Weight:       Height:           Active LDAs/IV Access:   Lines, Drains & Airways       Active LDAs       Name Placement date Placement time Site Days    Peripheral IV 12/08/24 1411 Right Antecubital 12/08/24  1411  Antecubital  less than 1                    Skin Condition:   Skin Assessments (last day)       None             Labs (abnormal labs have a star):   Labs Reviewed   COMPREHENSIVE METABOLIC PANEL - Abnormal; Notable for the following components:       Result Value    Glucose 107 (*)     Creatinine 1.09 (*)     AST (SGOT) 48 (*)     eGFR 52.4 (*)     All other components within normal limits    Narrative:     GFR Normal >60  Chronic Kidney Disease <60  Kidney Failure <15    The GFR formula is only valid for adults with stable renal function between ages 18 and 70.   PROTIME-INR - Abnormal; Notable for the following components:    Protime 15.6 (*)     INR 1.23 (*)     All other components within normal limits   TROPONIN - Abnormal; Notable for the following components:    HS Troponin T 852 (*)     All other components within normal limits    Narrative:     High Sensitive Troponin T Reference Range:  <14.0 ng/L- Negative Female for AMI  <22.0 ng/L- Negative Male for AMI  >=14 - Abnormal Female indicating possible myocardial injury.  >=22 - Abnormal Male indicating possible myocardial injury.   Clinicians would have to utilize clinical acumen, EKG, Troponin, and serial changes to determine if it is an Acute Myocardial Infarction or myocardial injury due to an  underlying chronic condition.        CBC WITH AUTO DIFFERENTIAL - Abnormal; Notable for the following components:    WBC 11.57 (*)     RBC 3.71 (*)     Hemoglobin 11.2 (*)     Neutrophil % 78.4 (*)     Lymphocyte % 12.0 (*)     Immature Grans % 0.8 (*)     Neutrophils, Absolute 9.07 (*)     Immature Grans, Absolute 0.09 (*)     All other components within normal limits   HIGH SENSITIVITIY TROPONIN T 2HR - Abnormal; Notable for the following components:    HS Troponin T 757 (*)     Troponin T Delta -95 (*)     All other components within normal limits    Narrative:     High Sensitive Troponin T Reference Range:  <14.0 ng/L- Negative Female for AMI  <22.0 ng/L- Negative Male for AMI  >=14 - Abnormal Female indicating possible myocardial injury.  >=22 - Abnormal Male indicating possible myocardial injury.   Clinicians would have to utilize clinical acumen, EKG, Troponin, and serial changes to determine if it is an Acute Myocardial Infarction or myocardial injury due to an underlying chronic condition.        APTT - Normal   MAGNESIUM - Normal   BNP (IN-HOUSE) - Normal    Narrative:     This assay is used as an aid in the diagnosis of individuals suspected of having heart failure. It can be used as an aid in the diagnosis of acute decompensated heart failure (ADHF) in patients presenting with signs and symptoms of ADHF to the emergency department (ED). In addition, NT-proBNP of <300 pg/mL indicates ADHF is not likely.    Age Range Result Interpretation  NT-proBNP Concentration (pg/mL:      <50             Positive            >450                   Gray                 300-450                    Negative             <300    50-75           Positive            >900                  Gray                300-900                  Negative            <300      >75             Positive            >1800                  Gray                300-1800                  Negative            <300   CBC AND DIFFERENTIAL    Narrative:      The following orders were created for panel order CBC & Differential.  Procedure                               Abnormality         Status                     ---------                               -----------         ------                     CBC Auto Differential[[id number]]        Abnormal            Final result                 Please view results for these tests on the individual orders.   EXTRA TUBES    Narrative:     The following orders were created for panel order Extra Tubes.  Procedure                               Abnormality         Status                     ---------                               -----------         ------                     [facility] - SST[[id number]]                                   Final result                 Please view results for these tests on the individual orders.   GOLD TOP - SST       LOC: Person, Place, Time, and Situation    Telemetry:  Telemetry    Cardiac Monitoring Ordered: yes    EKG:   ECG 12 Lead Rhythm Change   Preliminary Result   HEART RATE=60  bpm   RR Wuqjpgae=992  ms   RI Svxqphna=571  ms   P Horizontal Axis=58  deg   P Front Axis=52  deg   QRSD Interval=81  ms   QT Ozozjxwo=761  ms   NMbC=211  ms   QRS Axis=-22  deg   T Wave Axis=67  deg   - ABNORMAL ECG -   Sinus rhythm   Atrial premature complexes   Probable LVH with secondary repol abnrm   Inferior infarct, old   Date and Time of Study:2024-12-08 13:50:46          Medications Given in the ED:   Medications   sodium chloride 0.9 % flush 10 mL (has no administration in time range)   sodium chloride 0.9 % flush 10 mL (has no administration in time range)   sodium chloride 0.9 % infusion 40 mL (has no administration in time range)   nitroglycerin (NITROSTAT) SL tablet 0.4 mg (has no administration in time range)   acetaminophen (TYLENOL) tablet 650 mg (has no administration in time range)     Or   acetaminophen (TYLENOL) 160 MG/5ML oral solution 650 mg (has no administration in time range)     Or    acetaminophen (TYLENOL) suppository 650 mg (has no administration in time range)   sennosides-docusate (PERICOLACE) 8.6-50 MG per tablet 2 tablet (has no administration in time range)     And   polyethylene glycol (MIRALAX) packet 17 g (has no administration in time range)     And   bisacodyl (DULCOLAX) EC tablet 5 mg (has no administration in time range)     And   bisacodyl (DULCOLAX) suppository 10 mg (has no administration in time range)   dilTIAZem CD (CARDIZEM CD) 24 hr capsule 180 mg (has no administration in time range)   apixaban (ELIQUIS) tablet 5 mg (has no administration in time range)   metoprolol succinate XL (TOPROL-XL) 24 hr tablet 50 mg (has no administration in time range)   pantoprazole (PROTONIX) EC tablet 40 mg (has no administration in time range)   clopidogrel (PLAVIX) tablet 75 mg (75 mg Oral Given 12/8/24 1447)   aspirin chewable tablet 324 mg (324 mg Oral Given 12/8/24 1821)       Imaging results:  XR Chest 1 View    Result Date: 12/8/2024  Impression: Enlarged cardiac silhouette. Prominent central pulmonary vascularity, question pulmonary vascular congestion/edema. Electronically Signed: Nayana Wise  12/8/2024 3:09 PM EST  Workstation ID: [id number]     Social issues:   Social History     Socioeconomic History    Marital status:    Tobacco Use    Smoking status: Never     Passive exposure: Never    Smokeless tobacco: Never   Vaping Use    Vaping status: Never Used   Substance and Sexual Activity    Alcohol use: No    Drug use: No    Sexual activity: Not Currently     Birth control/protection: Post-menopausal       NIH Stroke Scale:  Interval: (not recorded)  1a. Level of Consciousness: (not recorded)  1b. LOC Questions: (not recorded)  1c. LOC Commands: (not recorded)  2. Best Gaze: (not recorded)  3. Visual: (not recorded)  4. Facial Palsy: (not recorded)  5a. Motor Arm, Left: (not recorded)  5b. Motor Arm, Right: (not recorded)  6a. Motor Leg, Left: (not recorded)  6b. Motor  Leg, Right: (not recorded)  7. Limb Ataxia: (not recorded)  8. Sensory: (not recorded)  9. Best Language: (not recorded)  10. Dysarthria: (not recorded)  11. Extinction and Inattention (formerly Neglect): (not recorded)    Total (NIH Stroke Scale): (not recorded)     Additional notable assessment information:     Nursing report ED to floor:  CALLED TO GIVE REPORT AT 2022 WAS ON HOLD FOR 7 MINUTES    Elizabeth Montalvo LPN   12/08/24 20:27 EST

## 2024-12-10 NOTE — CASE MANAGEMENT/SOCIAL WORK
Case Management Discharge Note      Final Note: Patient discharged prior to CM assessment. No service needs identified upon review.             Transportation Services  Private: Car (assumed with family)    Final Discharge Disposition Code: 01 - home or self-care

## 2024-12-10 NOTE — CASE MANAGEMENT/SOCIAL WORK
Case Management Discharge Note      Final Note: home with family         Selected Continued Care - Discharged on 12/9/2024 Admission date: 12/8/2024 - Discharge disposition: Home or Self Care       Transportation Services  Private: Car    Final Discharge Disposition Code: 01 - home or self-care

## 2024-12-12 LAB
QT INTERVAL: 354 MS
QTC INTERVAL: 386 MS

## 2024-12-31 LAB
CV ZIO AF AFL AVG BPM: 87 BPM
CV ZIO AF AFL BPM HIGH: 195 BPM
CV ZIO AF AFL BPM LOW: 40 BPM
CV ZIO AF AFL F EPI AVG BPM: 87 BPM
CV ZIO AF AFL F EPI BPM HIGH: 195 BPM
CV ZIO AF AFL F EPI BPM LOW: 40 BPM
CV ZIO AF AFL F EPI DT: NORMAL
CV ZIO AF AFL L EPI AVG BPM: 87 BPM
CV ZIO AF AFL L EPI BPM HIGH: 195 BPM
CV ZIO AF AFL L EPI BPM LOW: 40 BPM
CV ZIO AF AFL L EPI DUR: NORMAL
CV ZIO AF AFL L EPI END: NORMAL
CV ZIO AF AFL L EPI START: NORMAL
CV ZIO AF AFL PERCENT: 95 %
CV ZIO AF AFL S EPI AVG BPM: 87 BPM
CV ZIO AF AFL S EPI BPM HIGH: 195 BPM
CV ZIO AF AFL S EPI BPM LOW: 40 BPM
CV ZIO AF AFL S EPI DT: NORMAL
CV ZIO BASELINE AVG BPM: 86 BPM
CV ZIO BASELINE BPM HIGH: 197 BPM
CV ZIO BASELINE BPM LOW: 40 BPM
CV ZIO DEVICE ANALYSIS TIME: NORMAL
CV ZIO ECT SVE COUNT: 5001 EPISODES
CV ZIO ECT SVE CPLT COUNT: 559 EPISODES
CV ZIO ECT SVE CPLT FREQ: NORMAL
CV ZIO ECT SVE FREQ: NORMAL
CV ZIO ECT SVE TPLT COUNT: 107 EPISODES
CV ZIO ECT SVE TPLT FREQ: NORMAL
CV ZIO ECT VE COUNT: NORMAL EPISODES
CV ZIO ECT VE CPLT COUNT: 559 EPISODES
CV ZIO ECT VE CPLT FREQ: NORMAL
CV ZIO ECT VE FREQ: NORMAL
CV ZIO ECT VE TPLT COUNT: 21 EPISODES
CV ZIO ECT VE TPLT FREQ: NORMAL
CV ZIO ECTOPIC SVE COUPLET RAW PERCENT: 0.07 %
CV ZIO ECTOPIC SVE ISOLATED PERCENT: 0.31 %
CV ZIO ECTOPIC SVE TRIPLET RAW PERCENT: 0.02 %
CV ZIO ECTOPIC VE COUPLET RAW PERCENT: 0.07 %
CV ZIO ECTOPIC VE ISOLATED PERCENT: 1.51 %
CV ZIO ECTOPIC VE TRIPLET RAW PERCENT: 0 %
CV ZIO ENROLLMENT END: NORMAL
CV ZIO ENROLLMENT START: NORMAL
CV ZIO IRREG TYPE: NORMAL
CV ZIO L BIGEMINY DUR: 4.9 SEC
CV ZIO L BIGEMINY END: NORMAL
CV ZIO L BIGEMINY START: NORMAL
CV ZIO L TRIGEMINY DUR: 15.7 SEC
CV ZIO L TRIGEMINY END: NORMAL
CV ZIO L TRIGEMINY START: NORMAL
CV ZIO PATIENT EVENTS DIARIES: 0
CV ZIO PATIENT EVENTS TRIGGERS: 0
CV ZIO PAUSE COUNT: 5
CV ZIO PAUSE END: 3.5 SEC
CV ZIO PAUSE START: 3 SEC
CV ZIO PRESCRIPTION STATUS: NORMAL
CV ZIO SVT COUNT: 0
CV ZIO TOTAL  ENROLLMENT PERIOD: NORMAL
CV ZIO VT AVG BPM: 156 BPM
CV ZIO VT BPM HIGH: 197 BPM
CV ZIO VT BPM LOW: 120 BPM
CV ZIO VT COUNT: 3
CV ZIO VT F EPI AVG BPM: 163
CV ZIO VT F EPI BEATS: 4 BEATS
CV ZIO VT F EPI BPM HIGH: 197
CV ZIO VT F EPI BPM LOW: 120
CV ZIO VT F EPI DUR: 1.5 SEC
CV ZIO VT F EPI END: NORMAL
CV ZIO VT F EPI START: NORMAL
CV ZIO VT L EPI AVG BPM: 137
CV ZIO VT L EPI BEATS: 5 BEATS
CV ZIO VT L EPI BPM HIGH: 150 BPM
CV ZIO VT L EPI BPM LOW: 121 BPM
CV ZIO VT L EPI DUR: 2.3
CV ZIO VT L EPI END: NORMAL
CV ZIO VT L EPI START: NORMAL

## 2025-01-03 ENCOUNTER — TELEPHONE (OUTPATIENT)
Dept: CARDIOLOGY | Facility: CLINIC | Age: 80
End: 2025-01-03
Payer: MEDICARE

## 2025-01-08 ENCOUNTER — OFFICE VISIT (OUTPATIENT)
Dept: CARDIOLOGY | Facility: CLINIC | Age: 80
End: 2025-01-08
Payer: MEDICARE

## 2025-01-08 VITALS
OXYGEN SATURATION: 96 % | HEART RATE: 78 BPM | DIASTOLIC BLOOD PRESSURE: 75 MMHG | WEIGHT: 193 LBS | HEIGHT: 65 IN | BODY MASS INDEX: 32.15 KG/M2 | SYSTOLIC BLOOD PRESSURE: 153 MMHG

## 2025-01-08 DIAGNOSIS — I10 ESSENTIAL HYPERTENSION: ICD-10-CM

## 2025-01-08 DIAGNOSIS — I48.19 ATRIAL FIBRILLATION, PERSISTENT: Primary | ICD-10-CM

## 2025-01-08 PROBLEM — I48.0 PAROXYSMAL ATRIAL FIBRILLATION: Status: RESOLVED | Noted: 2023-01-04 | Resolved: 2025-01-08

## 2025-01-08 PROCEDURE — 99214 OFFICE O/P EST MOD 30 MIN: CPT | Performed by: INTERNAL MEDICINE

## 2025-01-08 PROCEDURE — 3078F DIAST BP <80 MM HG: CPT | Performed by: INTERNAL MEDICINE

## 2025-01-08 PROCEDURE — 3077F SYST BP >= 140 MM HG: CPT | Performed by: INTERNAL MEDICINE

## 2025-01-08 PROCEDURE — 1159F MED LIST DOCD IN RCRD: CPT | Performed by: INTERNAL MEDICINE

## 2025-01-08 PROCEDURE — 93000 ELECTROCARDIOGRAM COMPLETE: CPT | Performed by: INTERNAL MEDICINE

## 2025-01-08 PROCEDURE — 1160F RVW MEDS BY RX/DR IN RCRD: CPT | Performed by: INTERNAL MEDICINE

## 2025-01-08 NOTE — PROGRESS NOTES
Progress note      Name: Facundo Maguire ADMIT: (Not on file)   : 1945  PCP: Frank Padilla MD    MRN: [id number] LOS: 0 days   AGE/SEX: 79 y.o. female  ROOM: Room/bed info not found     Chief Complaint   Patient presents with    Follow-up     30 DAY POST ABLATION ekg       Subjective       History of present illness  Facundo Maguire is a 79-year-old female patient who has nonobstructive CAD, atrial fibrillation longstanding since at least 2019, is here today for follow-up after her ablation on 2024.    Past Medical History:   Diagnosis Date    Acute hypokalemia     Arthritis     Atrial fibrillation     Fibromyalgia     Hypertension     Palpitations      Past Surgical History:   Procedure Laterality Date    ABLATION OF DYSRHYTHMIC FOCUS      ANKLE SURGERY Left     Triple fracture    CARDIAC CATHETERIZATION      CARDIAC ELECTROPHYSIOLOGY PROCEDURE N/A 2024    Procedure: Ablation atrial fibrillation, PFA - REPS EMAILED;  Surgeon: Gee Barakat MD;  Location: [facility] CATH INVASIVE LOCATION;  Service: Cardiovascular;  Laterality: N/A;    TUBAL ABDOMINAL LIGATION       Family History   Problem Relation Age of Onset    Thyroid disease Sister     Cancer Brother     Clotting disorder Neg Hx      Social History     Tobacco Use    Smoking status: Never     Passive exposure: Never    Smokeless tobacco: Never   Vaping Use    Vaping status: Never Used   Substance Use Topics    Alcohol use: No    Drug use: No       Current Outpatient Medications:     dilTIAZem XR (DILACOR XR) 180 MG 24 hr capsule, Take 1 capsule by mouth Daily., Disp: 90 capsule, Rfl: 3    Eliquis 5 MG tablet tablet, Take 1 tablet by mouth 2 (Two) Times a Day., Disp: 180 tablet, Rfl: 1    lansoprazole (PREVACID) 30 MG capsule, Take 1 capsule by mouth Daily., Disp: , Rfl:     losartan (COZAAR) 100 MG tablet, Take 1 tablet by mouth Daily., Disp: 90 tablet, Rfl: 3    metoprolol succinate XL (TOPROL-XL) 50 MG 24 hr tablet,  Take 1 tablet by mouth Daily., Disp: , Rfl:   Allergies:  Cyclobenzaprine, Doxazosin mesylate, Lisinopril, Tetracycline, Valsartan, and Hydrochlorothiazide      Physical Exam  VITALS REVIEWED    General:      well developed, in no acute distress.    Head:      normocephalic and atraumatic.    Eyes:      PERRL/EOM intact, conjunctiva and sclera clear with out nystagmus.    Neck:      no masses, thyromegaly,  trachea central with normal respiratory effort and PMI displaced laterally  Lungs:      Clear to auscultation bilaterally  Heart:       Irregular rhythm  Msk:      no deformity or scoliosis noted of thoracic or lumbar spine.    Pulses:      pulses normal in all 4 extremities.    Extremities:       No lower extremity edema  Neurologic:      no focal deficits.   alert oriented x3  Skin:      intact without lesions or rashes.    Psych:      alert and cooperative; normal mood and affect; normal attention span and concentration.      Result Review :               Pertinent cardiac workup    Heart cath 2019 mild to moderate lesion in LAD and RCA  EKGs from September 2019 until September 2024 all showing atrial fibrillation  Echo 12/9/2024 ejection fraction 65%, moderate aortic stenosis.  Holter monitor for 13 days and 13 hours starting on 12/9/2024 atrial fibrillation 95% of the recording, average heart rate 85.  EKG 12/9/2024 sinus rhythm.        ECG 12 Lead    Date/Time: 1/8/2025 5:20 PM  Performed by: Gee Barakat MD    Authorized by: Gee Barakat MD  Comparison: compared with previous ECG   Comparison to previous ECG: Previously sinus  Rhythm: atrial fibrillation  Rate: normal  BPM: 69  Conduction: conduction normal  ST Segments: ST segments normal  QRS axis: normal  Other findings: non-specific ST-T wave changes              Assessment and Plan      Facundo Maguire is a 79-year-old female patient who has nonobstructive CAD, longstanding persistent atrial fibrillation, based on chart report since at  least September 2019.  She had JAVON guided cardioversion on 12/19/2020 but soon after reverted back to A-fib and had been in A-fib since then.  During our first visit patient wanted to try to restore sinus rhythm and for that reason A-fib ablation was performed on 12/6/2024 with PVI, posterior wall isolation and CTI line.  Unfortunately however A-fib recurred on follow-up Holter monitor.  Today we had a long discussion about managing atrial fibrillation moving forward.  My concern is that her A-fib is chronic and it would be extremely difficult to keep her in normal rhythm.  If she wishes to pursue rhythm restoration, I would recommend hospitalization for Tikosyn plus minus redo ablation.  Even then, the chances of maintaining sinus rhythm is not that high.  I did in fact see office notes from 2017 and A-fib was mentioned in those notes as well.  Patient would like to discuss with her daughter and let me know about her decision.    Diagnoses and all orders for this visit:    1. Atrial fibrillation, persistent (Primary)    2. Essential hypertension           Return in about 2 months (around 3/8/2025).  Patient was given instructions and counseling regarding her condition or for health maintenance advice. Please see specific information pulled into the AVS if appropriate.       Electronically signed by Gee Barakat MD, 01/08/25, 5:33 PM EST.

## 2025-03-12 ENCOUNTER — OFFICE VISIT (OUTPATIENT)
Dept: CARDIOLOGY | Facility: CLINIC | Age: 80
End: 2025-03-12
Payer: MEDICARE

## 2025-03-12 VITALS
BODY MASS INDEX: 32.28 KG/M2 | DIASTOLIC BLOOD PRESSURE: 91 MMHG | OXYGEN SATURATION: 95 % | WEIGHT: 194 LBS | SYSTOLIC BLOOD PRESSURE: 161 MMHG

## 2025-03-12 DIAGNOSIS — E78.5 HYPERLIPIDEMIA LDL GOAL <70: ICD-10-CM

## 2025-03-12 DIAGNOSIS — R00.2 PALPITATIONS: ICD-10-CM

## 2025-03-12 DIAGNOSIS — I25.10 CORONARY ARTERY DISEASE INVOLVING NATIVE CORONARY ARTERY OF NATIVE HEART WITHOUT ANGINA PECTORIS: Primary | ICD-10-CM

## 2025-03-12 DIAGNOSIS — I48.19 ATRIAL FIBRILLATION, PERSISTENT: ICD-10-CM

## 2025-03-12 DIAGNOSIS — I10 ESSENTIAL HYPERTENSION: ICD-10-CM

## 2025-03-12 DIAGNOSIS — I27.20 PULMONARY HYPERTENSION: ICD-10-CM

## 2025-03-12 DIAGNOSIS — Z79.02 LONG TERM CURRENT USE OF ANTITHROMBOTICS/ANTIPLATELETS: ICD-10-CM

## 2025-03-12 RX ORDER — HYDRALAZINE HYDROCHLORIDE 10 MG/1
10 TABLET, FILM COATED ORAL 2 TIMES DAILY
Qty: 60 TABLET | Refills: 3 | Status: SHIPPED | OUTPATIENT
Start: 2025-03-12

## 2025-03-12 NOTE — PROGRESS NOTES
[facility] CARDIOLOGY      REASON FOR FOLLOW-UP:  Coronary artery disease  Cardiomyopathy  Paroxysmal atrial fibrillation          Chief Complaint   Patient presents with    Coronary Artery Disease     F/U  States she had to stop losartan past 2 months due to itching.    Hypertension         Dear Frank Padilla MD        History of Present Illness   It was my pleasure to see Ms. Maguire in the office today.  She is a 79-year-old female with  known history of coronary artery disease, paroxysmal atrial fibrillation status post prior JAVON/cardioversion 12/19/2019, hypertension, cardiomyopathy, congestive heart failure with reduced ejection fraction.       Last heart catheterization 2019 showed mild to moderate obstructive disease involving the LAD and RCA with EF 35 to 40%.  Repeat echo 4/20/2020 with EF improved to 50%.  Pulmonary hypertension was also reported with mild MR/TR, moderate LAE.    Ms. Maguire was seen in evaluation by electrophysiologist Dr. Gallardo and underwent successful radiofrequency ablation for atrial fibrillation on 12/6/2024.  She was started on amiodarone 200 mg twice daily for rhythm suppression short-term.  In follow-up visit 1/8/2025 ambulatory monitor confirmed that her A-fib had recurred.  His office note is attached below for reference:      Today we had a long discussion about managing atrial fibrillation moving forward. My concern is that her A-fib is chronic and it would be extremely difficult to keep her in normal rhythm. If she wishes to pursue rhythm restoration, I would recommend hospitalization for Tikosyn plus minus redo ablation. Even then, the chances of maintaining sinus rhythm is not that high. I did in fact see office notes from 2017 and A-fib was mentioned in those notes as well. Patient would like to discuss with her daughter and let me know about her decision.    Today, the patient tells me that she had to discontinue losartan due to  itching.  This is the second ARB that has caused this side effect.  She stated that she actually felt much better after her ablation even though she went back into atrial fibrillation.  She still feels well.  She denies any chest discomfort such as pain, pressure or tightness.  She has occasional shortness of breath but has contributed that to recent viral illness.  Reports occasional palpitations, no lower extremity edema, dizziness or lightheadedness.  Her blood pressure is elevated in the office today.  She does not routinely check blood pressures at home.  She denies any abnormal bleeding.      ASSESSMENT:  Persistent atrial fibrillation status post ablation December 2024  Ischemic cardiomyopathy  Coronary artery disease-mild to moderate obstructive LAD/RCA  Pulmonary hypertension  Valvular heart disease  Primary hypertension with hypertensive heart disease  NYHA class I-II    PLAN:  I will start hydralazine 10 mg twice daily for blood pressure management.  She was asked to check her blood pressures at home and call if not improved.  Continue diltiazem, Eliquis, metoprolol.  Follow-up with electrophysiologist regarding further treatment of A-fib.  Follow-up in our office in 6 months or sooner if needed  Continue risk factor modification including heart healthy diet, routine exercise as tolerated, of the weight  Surveillance labs per primary care          CHF Guideline Directed Medical Therapy  Beta Blocker:   ARNI/ACE/ARB:   SGLT 2 inhibitors:   Diuretics:   Aldosterone Antagonist:   Vasodilators & Nitrates:       Diagnoses and all orders for this visit:    1. Coronary artery disease involving native coronary artery of native heart without angina pectoris (Primary)    2. Essential hypertension    3. Hyperlipidemia LDL goal <70    4. Palpitations    5. Long term current use of antithrombotics/antiplatelets    6. Pulmonary hypertension    7. Atrial fibrillation, persistent    Other orders  -     hydrALAZINE  (APRESOLINE) 10 MG tablet; Take 1 tablet by mouth 2 (Two) Times a Day.  Dispense: 60 tablet; Refill: 3          The following portions of the patient's history were reviewed and updated as appropriate: allergies, current medications, past family history, past medical history, past social history, past surgical history, and problem list.    REVIEW OF SYSTEMS:    Review of Systems   Cardiovascular:  Positive for palpitations.   Respiratory:  Positive for shortness of breath.    All other systems reviewed and are negative.      Vitals:    03/12/25 0904   BP: 161/91   SpO2:          PHYSICAL EXAM:    General: Alert, cooperative, no distress, appears stated age  Head:  Normocephalic, atraumatic, mucous membranes moist  Eyes:  Conjunctiva/corneas clear, EOM's intact     Neck:  Supple,  no JVD or bruit     Lungs: Clear to auscultation bilaterally, no wheezes rhonchi rales are noted  Chest wall: No tenderness  Musculoskeletal:   Ambulates freely   Heart::  Irregularly irregular rate and rhythm, S1 and S2 normal, no murmur, rub or gallop  Abdomen: Soft, non-tender, nondistended, bowel sounds active, no abdominal bruit  Extremities: No cyanosis, clubbing, or edema   Pulses: 2+ and symmetric all extremities  Skin:  No rashes or lesions  Neuro/psych: A&O x3. CN II through XII are grossly intact with appropriate affect        Past Medical History:   Diagnosis Date    Acute hypokalemia     Arthritis     Atrial fibrillation     Fibromyalgia     Hypertension     Palpitations        Past Surgical History:   Procedure Laterality Date    ABLATION OF DYSRHYTHMIC FOCUS      ANKLE SURGERY Left     Triple fracture    CARDIAC CATHETERIZATION      CARDIAC ELECTROPHYSIOLOGY PROCEDURE N/A 12/6/2024    Procedure: Ablation atrial fibrillation, PFA - REPS EMAILED;  Surgeon: Gee Barakat MD;  Location: [facility] INVASIVE LOCATION;  Service: Cardiovascular;  Laterality: N/A;    TUBAL ABDOMINAL LIGATION           Current Outpatient  "Medications:     dilTIAZem XR (DILACOR XR) 180 MG 24 hr capsule, Take 1 capsule by mouth Daily., Disp: 90 capsule, Rfl: 3    Eliquis 5 MG tablet tablet, Take 1 tablet by mouth 2 (Two) Times a Day., Disp: 180 tablet, Rfl: 1    lansoprazole (PREVACID) 30 MG capsule, Take 1 capsule by mouth Daily., Disp: , Rfl:     metoprolol succinate XL (TOPROL-XL) 50 MG 24 hr tablet, Take 1 tablet by mouth Daily., Disp: , Rfl:     hydrALAZINE (APRESOLINE) 10 MG tablet, Take 1 tablet by mouth 2 (Two) Times a Day., Disp: 60 tablet, Rfl: 3    Allergies   Allergen Reactions    Cyclobenzaprine Palpitations    Doxazosin Mesylate Other (See Comments)     itching    Lisinopril Cough    Tetracycline Nausea Only    Valsartan Itching     Itching , constant urination , nausea     Losartan Itching    Hydrochlorothiazide Other (See Comments)     Constant urination        Family History   Problem Relation Age of Onset    Thyroid disease Sister     Cancer Brother     Clotting disorder Neg Hx        Social History     Tobacco Use    Smoking status: Never     Passive exposure: Never    Smokeless tobacco: Never   Substance Use Topics    Alcohol use: No           Current Electrocardiogram:  Procedures        EMR Dragon/Transcription:   \"Dictated utilizing Dragon dictation\".     Copied text in this note has been reviewed by me and is accurate as of 03/13/25.    "

## 2025-04-10 RX ORDER — HYDRALAZINE HYDROCHLORIDE 10 MG/1
10 TABLET, FILM COATED ORAL 2 TIMES DAILY
Qty: 60 TABLET | Refills: 3 | Status: SHIPPED | OUTPATIENT
Start: 2025-04-10

## (undated) DEVICE — TBG IV DRIP CHAMBER MACRO SGL 72IN

## (undated) DEVICE — Device: Brand: CARTO 3

## (undated) DEVICE — INTRO STEER AGILIS NXT MED/CURL 8.5F

## (undated) DEVICE — PULSED FIELD ABLATION CATHETER: Brand: FARAWAVE™

## (undated) DEVICE — PAD E/S GRND SGL/FOIL 9FT/CORD DISP

## (undated) DEVICE — STEERABLE SHEATH CLEAR: Brand: FARADRIVE™

## (undated) DEVICE — UNI-DIRECTIONAL NAVIGATION CATHETER, NAV, F: Brand: QDOT MICRO

## (undated) DEVICE — Device: Brand: REFERENCE PATCH CARTO 3

## (undated) DEVICE — SOUNDSTAR ECO 8F G CATHETER: Brand: SOUNDSTAR

## (undated) DEVICE — BLANKT WARM UNDER/BDY FUL/ACC A/ 90X206CM

## (undated) DEVICE — PINNACLE INTRODUCER SHEATH: Brand: PINNACLE

## (undated) DEVICE — 1 X VERSACROSS CONNECT TRANSSEPTAL DILATOR;  1 X VERSACROSS RF WIRE (INCLUDING 1 X CONNECTOR CABLE (SINGLE USE)): Brand: VERSACROSS CONNECT ACCESS SOLUTION FOR FARADRIVE

## (undated) DEVICE — PK TRY HEART CATH 50

## (undated) DEVICE — ELECTRD DEFIB M/FUNC PROPADZ RADIOL 2PK

## (undated) DEVICE — Device: Brand: SMARTABLATE

## (undated) DEVICE — INTERFACE CABLE: Brand: CARTO 3 SYSTEM ECO INTERFACE CABLE

## (undated) DEVICE — TX ECO EXT CABLE: Brand: TX ECO EXT CABLE

## (undated) DEVICE — INTRO PERFORMER CHECKFLO/LG RAD/BND NO/GW 18F .038IN 30CM

## (undated) DEVICE — CATHETER CONNECTION CABLE: Brand: FARASTAR™

## (undated) DEVICE — Device: Brand: WEBSTER CS

## (undated) DEVICE — OCTA,PERSEID,2-2-2-2-2,D-CURVE: Brand: OCTARAY MAPPING CATHETER

## (undated) DEVICE — PROVE COVER: Brand: UNBRANDED